# Patient Record
Sex: MALE | Race: WHITE | Employment: FULL TIME | URBAN - METROPOLITAN AREA
[De-identification: names, ages, dates, MRNs, and addresses within clinical notes are randomized per-mention and may not be internally consistent; named-entity substitution may affect disease eponyms.]

---

## 2017-08-02 ENCOUNTER — HOSPITAL ENCOUNTER (OUTPATIENT)
Dept: LAB | Age: 42
Discharge: HOME | End: 2017-08-02
Payer: OTHER GOVERNMENT

## 2017-08-02 ENCOUNTER — OFFICE VISIT (OUTPATIENT)
Dept: HEMATOLOGY | Age: 42
End: 2017-08-02

## 2017-08-02 VITALS
RESPIRATION RATE: 18 BRPM | HEIGHT: 69 IN | BODY MASS INDEX: 43.66 KG/M2 | TEMPERATURE: 98.7 F | OXYGEN SATURATION: 97 % | HEART RATE: 87 BPM | WEIGHT: 294.8 LBS | DIASTOLIC BLOOD PRESSURE: 79 MMHG | SYSTOLIC BLOOD PRESSURE: 122 MMHG

## 2017-08-02 DIAGNOSIS — R74.8 ELEVATED LIVER ENZYMES: ICD-10-CM

## 2017-08-02 DIAGNOSIS — R74.8 ELEVATED LIVER ENZYMES: Primary | ICD-10-CM

## 2017-08-02 PROBLEM — Z99.89 OSA ON CPAP: Status: ACTIVE | Noted: 2017-08-02

## 2017-08-02 PROBLEM — G47.30 SLEEP APNEA: Status: ACTIVE | Noted: 2017-08-02

## 2017-08-02 PROBLEM — I10 HYPERTENSION: Status: ACTIVE | Noted: 2017-08-02

## 2017-08-02 PROBLEM — E11.9 TYPE II DIABETES MELLITUS (HCC): Status: ACTIVE | Noted: 2017-08-02

## 2017-08-02 PROBLEM — E78.00 HYPERCHOLESTEROLEMIA: Status: ACTIVE | Noted: 2017-08-02

## 2017-08-02 PROBLEM — G47.33 OSA ON CPAP: Status: ACTIVE | Noted: 2017-08-02

## 2017-08-02 LAB
ALBUMIN SERPL BCP-MCNC: 4.2 G/DL (ref 3.4–5)
ALBUMIN/GLOB SERPL: 1.2 {RATIO} (ref 0.8–1.7)
ALP SERPL-CCNC: 55 U/L (ref 45–117)
ALT SERPL-CCNC: 222 U/L (ref 16–61)
ANION GAP BLD CALC-SCNC: 11 MMOL/L (ref 3–18)
AST SERPL W P-5'-P-CCNC: 84 U/L (ref 15–37)
BASOPHILS # BLD AUTO: 0 K/UL (ref 0–0.06)
BASOPHILS # BLD: 1 % (ref 0–2)
BILIRUB DIRECT SERPL-MCNC: 0.1 MG/DL (ref 0–0.2)
BILIRUB SERPL-MCNC: 0.7 MG/DL (ref 0.2–1)
BUN SERPL-MCNC: 16 MG/DL (ref 7–18)
BUN/CREAT SERPL: 17 (ref 12–20)
CALCIUM SERPL-MCNC: 9.5 MG/DL (ref 8.5–10.1)
CHLORIDE SERPL-SCNC: 98 MMOL/L (ref 100–108)
CHOLEST SERPL-MCNC: 203 MG/DL
CO2 SERPL-SCNC: 31 MMOL/L (ref 21–32)
CREAT SERPL-MCNC: 0.92 MG/DL (ref 0.6–1.3)
DIFFERENTIAL METHOD BLD: ABNORMAL
EOSINOPHIL # BLD: 0.3 K/UL (ref 0–0.4)
EOSINOPHIL NFR BLD: 4 % (ref 0–5)
ERYTHROCYTE [DISTWIDTH] IN BLOOD BY AUTOMATED COUNT: 14.7 % (ref 11.6–14.5)
EST. AVERAGE GLUCOSE BLD GHB EST-MCNC: 160 MG/DL
FERRITIN SERPL-MCNC: 648 NG/ML (ref 8–388)
GLOBULIN SER CALC-MCNC: 3.6 G/DL (ref 2–4)
GLUCOSE SERPL-MCNC: 112 MG/DL (ref 74–99)
HBA1C MFR BLD: 7.2 % (ref 4.5–5.6)
HCT VFR BLD AUTO: 46.7 % (ref 36–48)
HDLC SERPL-MCNC: 41 MG/DL (ref 40–60)
HDLC SERPL: 5 {RATIO} (ref 0–5)
HGB BLD-MCNC: 15.7 G/DL (ref 13–16)
INR PPP: 1 (ref 0.8–1.2)
IRON SATN MFR SERPL: 37 %
IRON SERPL-MCNC: 123 UG/DL (ref 50–175)
LDLC SERPL CALC-MCNC: 136 MG/DL (ref 0–100)
LIPID PROFILE,FLP: ABNORMAL
LYMPHOCYTES # BLD AUTO: 23 % (ref 21–52)
LYMPHOCYTES # BLD: 2 K/UL (ref 0.9–3.6)
MCH RBC QN AUTO: 29.3 PG (ref 24–34)
MCHC RBC AUTO-ENTMCNC: 33.6 G/DL (ref 31–37)
MCV RBC AUTO: 87.1 FL (ref 74–97)
MONOCYTES # BLD: 0.4 K/UL (ref 0.05–1.2)
MONOCYTES NFR BLD AUTO: 5 % (ref 3–10)
NEUTS SEG # BLD: 6 K/UL (ref 1.8–8)
NEUTS SEG NFR BLD AUTO: 67 % (ref 40–73)
PLATELET # BLD AUTO: 355 K/UL (ref 135–420)
PMV BLD AUTO: 10.1 FL (ref 9.2–11.8)
POTASSIUM SERPL-SCNC: 4 MMOL/L (ref 3.5–5.5)
PROT SERPL-MCNC: 7.8 G/DL (ref 6.4–8.2)
PROTHROMBIN TIME: 12.8 SEC (ref 11.5–15.2)
RBC # BLD AUTO: 5.36 M/UL (ref 4.7–5.5)
SODIUM SERPL-SCNC: 140 MMOL/L (ref 136–145)
TIBC SERPL-MCNC: 332 UG/DL (ref 250–450)
TRIGL SERPL-MCNC: 130 MG/DL (ref ?–150)
VLDLC SERPL CALC-MCNC: 26 MG/DL
WBC # BLD AUTO: 8.7 K/UL (ref 4.6–13.2)

## 2017-08-02 PROCEDURE — 82728 ASSAY OF FERRITIN: CPT | Performed by: INTERNAL MEDICINE

## 2017-08-02 PROCEDURE — 86708 HEPATITIS A ANTIBODY: CPT | Performed by: INTERNAL MEDICINE

## 2017-08-02 PROCEDURE — 82103 ALPHA-1-ANTITRYPSIN TOTAL: CPT | Performed by: INTERNAL MEDICINE

## 2017-08-02 PROCEDURE — 85610 PROTHROMBIN TIME: CPT | Performed by: INTERNAL MEDICINE

## 2017-08-02 PROCEDURE — 85025 COMPLETE CBC W/AUTO DIFF WBC: CPT | Performed by: INTERNAL MEDICINE

## 2017-08-02 PROCEDURE — 80061 LIPID PANEL: CPT | Performed by: INTERNAL MEDICINE

## 2017-08-02 PROCEDURE — 80076 HEPATIC FUNCTION PANEL: CPT | Performed by: INTERNAL MEDICINE

## 2017-08-02 PROCEDURE — 83540 ASSAY OF IRON: CPT | Performed by: INTERNAL MEDICINE

## 2017-08-02 PROCEDURE — 82390 ASSAY OF CERULOPLASMIN: CPT | Performed by: INTERNAL MEDICINE

## 2017-08-02 PROCEDURE — 86704 HEP B CORE ANTIBODY TOTAL: CPT | Performed by: INTERNAL MEDICINE

## 2017-08-02 PROCEDURE — 83516 IMMUNOASSAY NONANTIBODY: CPT | Performed by: INTERNAL MEDICINE

## 2017-08-02 PROCEDURE — 86706 HEP B SURFACE ANTIBODY: CPT | Performed by: INTERNAL MEDICINE

## 2017-08-02 PROCEDURE — 83036 HEMOGLOBIN GLYCOSYLATED A1C: CPT | Performed by: INTERNAL MEDICINE

## 2017-08-02 PROCEDURE — 36415 COLL VENOUS BLD VENIPUNCTURE: CPT | Performed by: INTERNAL MEDICINE

## 2017-08-02 PROCEDURE — 80048 BASIC METABOLIC PNL TOTAL CA: CPT | Performed by: INTERNAL MEDICINE

## 2017-08-02 RX ORDER — LISINOPRIL AND HYDROCHLOROTHIAZIDE 20; 25 MG/1; MG/1
TABLET ORAL DAILY
COMMUNITY

## 2017-08-02 RX ORDER — LORATADINE 10 MG/1
10 TABLET ORAL DAILY
COMMUNITY

## 2017-08-02 NOTE — PROGRESS NOTES
Georges Schaefer is a 38 y.o. male    No chief complaint on file. 1. Have you been to the ER, urgent care clinic or hospitalized since your last visit? NO.     2. Have you seen or consulted any other health care providers outside of the [facility] since your last visit (Include any pap smears or colon screening)?  NO

## 2017-08-02 NOTE — PROGRESS NOTES
[address] MD Dieter, [address], [place], [place]       ROSI España PA-C Charyl Chock, NP Michele Lab NP        at [address], [address] Abran Santillan  22.     [phone]     FAX: [phone]    at [address], [address] [facility], [address]     [phone]     FAX: [phone]         Patient Care Team:  Tomasa Rebolledo NP as PCP - General (Nurse Practitioner)      Problem List  Date Reviewed: 8/2/2017          Codes Class Noted    Elevated liver enzymes ICD-10-CM: R74.8  ICD-9-CM: 790.5  8/2/2017        Type II diabetes mellitus ([facility] [address] ICD-10-CM: E11.9  ICD-9-CM: 250.00  8/2/2017        Hypercholesterolemia ICD-10-CM: E78.00  ICD-9-CM: 272.0  8/2/2017        SHIRA on CPAP ICD-10-CM: G47.33, Z99.89  ICD-9-CM: 327.23, V46.8  8/2/2017        Hypertension ICD-10-CM: I10  ICD-9-CM: 401.9  8/2/2017                The physicians listed above have asked me to see Nathen Schreiber in consultation regarding elevated liver enzymes and its management. All medical records sent by the referring physicians were reviewed     The patient is a 38 y.o.  male who was first noted to have abnormalities in liver transaminases in 1/2017. Serologic evaluation for markers of chronic liver disease were negative for HCV, HBV, autoimmune liver disease. Imaging of the liver was not performed. An assessment of liver fibrosis with biopsy or elastography has not been performed. The patient had not started any new medications within 3 months preceeding the elevation in liver chemistries.     The most recent laboratory studies indicate that the liver transaminases are elevated, ALP is normal, tests of hepatic synthetic and metabolic function are normal, and the platelet count is normal.    The patient has no symptoms which could be attributed to the liver disorder. The patient completes all daily activities without any functional limitations. The patient has not experienced fatigue, pain in the right side over the liver,       ALLERGIES  No Known Allergies    MEDICATIONS  Current Outpatient Prescriptions   Medication Sig    lisinopril-hydroCHLOROthiazide (PRINZIDE, ZESTORETIC) 20-25 mg per tablet Take  by mouth daily.  loratadine (CLARITIN) 10 mg tablet Take 10 mg by mouth. No current facility-administered medications for this visit. SYSTEM REVIEW NOT RELATED TO LIVER DISEASE OR REVIEWED ABOVE:  Constitution systems: Negative for fever, chills, weight gain, weight loss. Eyes: Negative for visual changes. ENT: Negative for sore throat, painful swallowing. Respiratory: Negative for cough, hemoptysis, SOB. Cardiology: Negative for chest pain, palpitations. GI:  Negative for constipation or diarrhea. : Negative for urinary frequency, dysuria, hematuria, nocturia. Skin: Negative for rash. Hematology: Negative for easy bruising, blood clots. Musculo-skelatal: Negative for back pain, muscle pain, weakness. Neurologic: Negative for headaches, dizziness, vertigo, memory problems not related to HE. Psychology: Negative for anxiety, depression. FAMILY HISTORY:  The father has the following chronic diseases: DM. The mother has the following chronic diseases: DM. There is no family history of liver disease. SOCIAL HISTORY:  The patient is . The patient has 2 children, a son  in a MVA. The patient has never used tobacco products. The patient has never consumed significant amounts of alcohol. The patient currently works full time at [employer].         PHYSICAL EXAMINATION:  Visit Vitals    /79 (BP 1 Location: Right arm, BP Patient Position: Sitting)    Pulse 87    Temp 98.7 °F (37.1 °C) (Tympanic)    Resp 18    Ht 5' 9\" (1.753 m)    Wt 294 lb 12.8 oz (133.7 kg)    SpO2 97%    BMI 43.53 kg/m2     General: No acute distress. Eyes: Sclera anicteric. ENT: No oral lesions. Thyroid normal.  Nodes: No adenopathy. Skin: No spider angiomata. No jaundice. No palmar erythema. Respiratory: Lungs clear to auscultation. Cardiovascular: Regular heart rate. No murmurs. No JVD. Abdomen: Soft non-tender. Liver size normal to percussion/palpation. Spleen not palpable. No obvious ascites. Extremities: No edema. No muscle wasting. No gross arthritic changes. Neurologic: Alert and oriented. Cranial nerves grossly intact. No asterixis. LABORATORY STUDIES:  Liver [facility] Ref Rng & Units 8/2/2017   WBC 4.6 - 13.2 K/uL 8.7   ANC 1.8 - 8.0 K/UL 6.0   HGB 13.0 - 16.0 g/dL 15.7    - 420 K/uL 355   INR 0.8 - 1.2   1.0   AST 15 - 37 U/L 84 (H)   ALT 16 - 61 U/L 222 (H)   Alk Phos 45 - 117 U/L 55   Bili, Total 0.2 - 1.0 MG/DL 0.7   Bili, Direct 0.0 - 0.2 MG/DL 0.1   Albumin 3.4 - 5.0 g/dL 4.2   BUN 7.0 - 18 MG/DL 16   Creat 0.6 - 1.3 MG/DL 0.92   Na 136 - 145 mmol/L 140   K 3.5 - 5.5 mmol/L 4.0   Cl 100 - 108 mmol/L 98 (L)   CO2 21 - 32 mmol/L 31   Glucose 74 - 99 mg/dL 112 (H)     SEROLOGIES:  2/2017. HBsurface antigen negative, anti-HCV negative, Ferritin 567, CARMELITA negative,     Serologies Latest Ref Rng & Units 8/2/2017   Hep A Ab, Total NEGATIVE   Positive (A)   Hep B Core Ab, Total NEGATIVE   NEGATIVE   Hep B Surface Ab >10.0 mIU/mL <3.10 (L)   Hep B Surface Ab Interp POS   NEGATIVE (A)   Ferritin 8 - 388 NG/ (H)   Iron % Saturation % 37   ASMCA 0 - 19 Units 9   Ceruloplasmin 16.0 - 31.0 mg/dL 28.5   Alpha-1 antitrypsin level 90 - 200 mg/dL 153     LIVER HISTOLOGY:  Not available or performed    ENDOSCOPIC PROCEDURES:  Not available or performed    RADIOLOGY:  Not available or performed    OTHER TESTING:  Not available or performed    ASSESSMENT AND PLAN:  Persistent elevation in liver transaminases of unclear etiology at this time.       Liver function is normal.  The platelet count is normal.      Serologic testing to help define the cause of the laboratory abnormality were all negative. The most likely causes for the liver chemistry abnormalities were discussed with the patient and include fatty liver disease,     Will perform laboratory testing to monitor liver function and degree of liver injury. This included BMP, hepatic panel, CBC with platelet count, INR    Will perform imaging of the liver with ultrasound. The need to perform a liver biopsy to help determine the cause and severity of the liver test abnormalities was discussed. The risks of performing the liver biopsy including pain, puncture of the lung, gallbladder, intestine or kidney and bleeding were discussed. The patient has decided to have a liver biopsy. This will be scheduled. The patient was directed to continue all current medications at the current dosages. There are no contraindications for the patient to take any medications that are necessary for treatment of other medical issues. The patient was counseled regarding alcohol consumption. Vaccination for viral hepatitis B is recommended since the patient has no serologic evidence of previous exposure or vaccination with immunity. The patient was likely vaccinated for HBV after he joined the [employer]. The level of antibody may have declined below detection. Would give 1 dose of booster and see if he responds. Vaccination for viral hepatitis A is not needed. The patient has serologic evidence of prior exposure or vaccination with immunity. All of the above issues were discussed with the patient. All questions were answered. The patient expressed a clear understanding of the above. [address] [address] in 2 weeks after liver biopsy.     Tamara Sandhu MD  Liver [place] of [address] [place] [address], [address] [place] [address] [facility], [address]  [phone]

## 2017-08-02 NOTE — MR AVS SNAPSHOT
Visit Information Date & Time Provider Department Dept. Phone Encounter #  
 8/2/2017 11:30 AM Bonifacio Rodriguez MD [facility] [phone] [id number] Upcoming Health Maintenance Date Due DTaP/Tdap/Td series (1 - Tdap) 6/7/1998 INFLUENZA AGE 9 TO ADULT 8/1/2017 Allergies as of 8/2/2017  Review Complete On: 8/2/2017 By: Maria Eugenia Bingham No Known Allergies Current Immunizations  Never Reviewed No immunizations on file. Not reviewed this visit You Were Diagnosed With   
  
 Codes Comments Elevated liver enzymes    -  Primary ICD-10-CM: R74.8 ICD-9-CM: 790.5 Vitals BP Pulse Temp Resp Height(growth percentile) 122/79 (BP 1 Location: Right arm, BP Patient Position: Sitting) 87 98.7 °F (37.1 °C) (Tympanic) 18 5' 9\" (1.753 m) Weight(growth percentile) SpO2 BMI Smoking Status 294 lb 12.8 oz (133.7 kg) 97% 43.53 kg/m2 Former Smoker BMI and BSA Data Body Mass Index Body Surface Area  
 43.53 kg/m 2 2.55 m 2 Your Updated Medication List  
  
   
This list is accurate as of: 8/2/17 12:50 PM.  Always use your most recent med list.  
  
  
  
  
 lisinopril-hydroCHLOROthiazide 20-25 mg per tablet Commonly known as:  Oksana Sabins Take  by mouth daily. loratadine 10 mg tablet Commonly known as:  Joni Ana Rosa Take 10 mg by mouth. To-Do List   
 08/02/2017 Lab:  ACTIN (SMOOTH MUSCLE) ANTIBODY   
  
 08/02/2017 Lab:  ALPHA-1-ANTITRYPSIN, TOTAL   
  
 08/02/2017 Lab:  CBC WITH AUTOMATED DIFF   
  
 08/02/2017 Lab:  CERULOPLASMIN   
  
 08/02/2017 Lab:  FERRITIN   
  
 08/02/2017 Lab:  HEMOGLOBIN A1C WITH EAG   
  
 08/02/2017 Lab:  HEP A AB, TOTAL   
  
 08/02/2017 Lab:  HEP B SURFACE AB   
  
 08/02/2017 Lab:  HEPATIC FUNCTION PANEL   
  
 08/02/2017 Lab:  HEPATITIS B CORE AB, TOTAL   
  
 08/02/2017   Lab:  IRON PROFILE   
  
 08/02/2017 Lab:  LIPID PANEL   
  
 08/02/2017 Lab:  METABOLIC PANEL, BASIC   
  
 08/02/2017 Lab:  PROTHROMBIN TIME + INR   
  
 09/01/2017 Procedures:  BIOPSY LIVER Introducing [facility]! Elvira Peterson introduces Intr[employer] patient portal. Now you can access parts of your medical record, email your doctor's office, and request medication refills online. 1. In your internet browser, go to [email or web address] 2. Click on the First Time User? Click Here link in the Sign In box. You will see the New Member Sign Up page. 3. Enter your [employer] Access Code exactly as it appears below. You will not need to use this code after youve completed the sign-up process. If you do not sign up before the expiration date, you must request a new code. · [employer] Access Code: [email or web address] Expires: 10/31/2017 12:50 PM 
 
4. Enter the last four digits of your Social Security Number (xxxx) and Date of Birth (mm/dd/yyyy) as indicated and click Submit. You will be taken to the next sign-up page. 5. Create a Intr[employer]ic ID. This will be your [employer]ic login ID and cannot be changed, so think of one that is secure and easy to remember. 6. Create a [employer] password. You can change your password at any time. 7. Enter your Password Reset Question and Answer. This can be used at a later time if you forget your password. 8. Enter your e-mail address. You will receive e-mail notification when new information is available in [address] 9. Click Sign Up. You can now view and download portions of your medical record. 10. Click the Download Summary menu link to download a portable copy of your medical information. If you have questions, please visit the Frequently Asked Questions section of the [employer] website. Remember, [employer] is NOT to be used for urgent needs. For medical emergencies, dial 911. Now available from your iPhone and Android! Please provide this summary of care documentation to your next provider. Your primary care clinician is listed as Bruce Ramos. If you have any questions after today's visit, please call [phone].

## 2017-08-03 LAB
A1AT SERPL-MCNC: 153 MG/DL (ref 90–200)
ACTIN IGG SERPL-ACNC: 9 UNITS (ref 0–19)
CERULOPLASMIN SERPL-MCNC: 28.5 MG/DL (ref 16–31)
HAV AB SER QL IA: POSITIVE
HBV CORE AB SERPL QL IA: NEGATIVE
HBV SURFACE AB SER QL IA: NEGATIVE
HBV SURFACE AB SERPL IA-ACNC: <3.1 MIU/ML
HEP BS AB COMMENT,HBSAC: ABNORMAL

## 2017-09-20 ENCOUNTER — APPOINTMENT (OUTPATIENT)
Dept: ULTRASOUND IMAGING | Age: 42
End: 2017-09-20
Attending: INTERNAL MEDICINE
Payer: OTHER GOVERNMENT

## 2017-09-20 ENCOUNTER — HOSPITAL ENCOUNTER (OUTPATIENT)
Age: 42
Setting detail: OUTPATIENT SURGERY
Discharge: HOME | End: 2017-09-20
Attending: INTERNAL MEDICINE | Admitting: INTERNAL MEDICINE
Payer: OTHER GOVERNMENT

## 2017-09-20 VITALS
WEIGHT: 286 LBS | SYSTOLIC BLOOD PRESSURE: 102 MMHG | RESPIRATION RATE: 18 BRPM | OXYGEN SATURATION: 97 % | DIASTOLIC BLOOD PRESSURE: 61 MMHG | TEMPERATURE: 96 F | BODY MASS INDEX: 42.36 KG/M2 | HEART RATE: 65 BPM | HEIGHT: 69 IN

## 2017-09-20 DIAGNOSIS — R79.89 ELEVATED LFTS: ICD-10-CM

## 2017-09-20 LAB — GLUCOSE BLD STRIP.AUTO-MCNC: 135 MG/DL (ref 70–110)

## 2017-09-20 PROCEDURE — 77030013826 HC NDL BIOP MAXCOR BARD -B: Performed by: INTERNAL MEDICINE

## 2017-09-20 PROCEDURE — 77030018836 HC SOL IRR NACL ICUM -A: Performed by: INTERNAL MEDICINE

## 2017-09-20 PROCEDURE — 76040000019: Performed by: INTERNAL MEDICINE

## 2017-09-20 PROCEDURE — 74011250636 HC RX REV CODE- 250/636: Performed by: INTERNAL MEDICINE

## 2017-09-20 PROCEDURE — 88307 TISSUE EXAM BY PATHOLOGIST: CPT | Performed by: INTERNAL MEDICINE

## 2017-09-20 PROCEDURE — 74011000250 HC RX REV CODE- 250: Performed by: INTERNAL MEDICINE

## 2017-09-20 PROCEDURE — 76942 ECHO GUIDE FOR BIOPSY: CPT

## 2017-09-20 PROCEDURE — 88313 SPECIAL STAINS GROUP 2: CPT | Performed by: INTERNAL MEDICINE

## 2017-09-20 PROCEDURE — 82962 GLUCOSE BLOOD TEST: CPT

## 2017-09-20 RX ORDER — LIDOCAINE HYDROCHLORIDE 10 MG/ML
10 INJECTION INFILTRATION; PERINEURAL ONCE
Status: COMPLETED | OUTPATIENT
Start: 2017-09-20 | End: 2017-09-20

## 2017-09-20 RX ORDER — SODIUM CHLORIDE 0.9 % (FLUSH) 0.9 %
5-10 SYRINGE (ML) INJECTION AS NEEDED
Status: DISCONTINUED | OUTPATIENT
Start: 2017-09-20 | End: 2017-09-20 | Stop reason: HOSPADM

## 2017-09-20 RX ORDER — SODIUM CHLORIDE 9 MG/ML
100 INJECTION, SOLUTION INTRAVENOUS CONTINUOUS
Status: DISCONTINUED | OUTPATIENT
Start: 2017-09-20 | End: 2017-09-20 | Stop reason: HOSPADM

## 2017-09-20 RX ORDER — SODIUM CHLORIDE 0.9 % (FLUSH) 0.9 %
5-10 SYRINGE (ML) INJECTION EVERY 8 HOURS
Status: DISCONTINUED | OUTPATIENT
Start: 2017-09-20 | End: 2017-09-20 | Stop reason: HOSPADM

## 2017-09-20 RX ORDER — HYDROMORPHONE HYDROCHLORIDE 1 MG/ML
1 INJECTION, SOLUTION INTRAMUSCULAR; INTRAVENOUS; SUBCUTANEOUS
Status: DISCONTINUED | OUTPATIENT
Start: 2017-09-20 | End: 2017-09-20 | Stop reason: HOSPADM

## 2017-09-20 RX ADMIN — HYDROMORPHONE HYDROCHLORIDE 1 MG: 1 INJECTION, SOLUTION INTRAMUSCULAR; INTRAVENOUS; SUBCUTANEOUS at 08:57

## 2017-09-20 NOTE — IP AVS SNAPSHOT
[address] [address] 
[phone] Patient: Chelle Marques MRN: [id number] JUDIT:[id number] You are allergic to the following No active allergies Recent Documentation Height Weight BMI Smoking Status 1.753 m 129.7 kg 42.23 kg/m2 Former Smoker Emergency Contacts Name Discharge Info Relation Home Work Mobile Jessie Raya  Spouse [3] [phone] Diamond Raya  Spouse [3] [phone] About your hospitalization You were admitted on:  September 20, 2017 You last received care in the:  [facility] ENDOSCOPY You were discharged on:  September 20, 2017 Unit phone number:  [phone] Why you were hospitalized Your primary diagnosis was:  Not on File Providers Seen During Your Hospitalizations Provider Role Specialty Primary office phone Indy Jones MD Attending Provider Hepatology [phone] Your Primary Care Physician (PCP) Primary Care Physician Office Phone Office Fax Akanksha Zelaya [phone] Follow-up Information Follow up With Details Comments Contact Info Indy Jones MD   [address] 
[phone] Nancy Blake NP   [address] 
[phone] Your Appointments Wednesday October 11, 2017 11:15 AM EDT Follow Up with Indy Jones MD  
[id number] [facility] ([address])  
 [place] [address]  
[phone] Current Discharge Medication List  
  
CONTINUE these medications which have NOT CHANGED Dose & Instructions Dispensing Information Comments Morning Noon Evening Bedtime  
 lisinopril-hydroCHLOROthiazide 20-25 mg per tablet Commonly known as:  Lore Issa Your last dose was: Your next dose is: Take  by mouth daily. Refills:  0  
     
   
   
   
  
 loratadine 10 mg tablet Commonly known as:  Pietro Nomi Your last dose was: Your next dose is:    
   
   
 Dose:  10 mg Take 10 mg by mouth daily. Refills:  0  
     
   
   
   
  
 multivitamin tablet Commonly known as:  ONE A DAY Your last dose was: Your next dose is:    
   
   
 Dose:  1 Tab Take 1 Tab by mouth daily. Refills:  0  
     
   
   
   
  
 VITAMIN B-12 1,000 mcg tablet Generic drug:  cyanocobalamin Your last dose was: Your next dose is:    
   
   
 Dose:  1000 mcg Take 1,000 mcg by mouth daily. Refills:  0 Discharge Instructions Percutaneous Liver Biopsy: What to Expect at Home Your Recovery Percutaneous liver biopsy is a procedure to take a tiny sample (biopsy) of your liver tissue. Percutaneous (say \"per-dali-MAEGAN-valdo-) means \"through the skin. \" The procedure is also called aspiration biopsy or fine-needle aspiration. The tissue sample is looked at under a microscope. Your doctor can look for infection or other liver problems. You may have some pain where the biopsy needle entered your skin (the puncture site). You may also have pain in your shoulder. This is called referred pain. It is caused by pain traveling along a nerve near the biopsy site. The referred pain usually lasts less than 12 hours. You may have a small amount of bleeding from the puncture site. You will need to take it easy at home for 1 or 2 days after the procedure. You will probably be able to return to work and most of your usual activities after that. This care sheet gives you a general idea about how long it will take for you to recover. But each person recovers at a different pace. Follow the steps below to get better as quickly as possible. How can you care for yourself at home? Activity · Rest when you feel tired. Getting enough sleep will help you recover. · Try to walk each day. Start by walking a little more than you did the day before. Bit by bit, increase the amount you walk. Walking boosts blood flow and helps prevent pneumonia and constipation. · Avoid exercises that use your belly muscles and strenuous activities, such as bicycle riding, jogging, weight lifting, or aerobic exercise, for 1 week or until your doctor says it is okay. · Ask your doctor when you can drive again. · You will probably need to take 1 or 2 days off from work. It depends on the type of work you do and how you feel. · You will probably be able to shower the same day as the test, if your doctor says it is okay. Pat the puncture site dry. Do not take a bath for at least 2 days after the test, or until your doctor tells you it is okay. Diet · You can eat your normal diet. If your stomach is upset, try bland, low-fat foods like plain rice, broiled chicken, toast, and yogurt. · Drink plenty of fluids (unless your doctor tells you not to). Medicines · Your doctor will tell you if and when you can restart your medicines. He or she will also give you instructions about taking any new medicines. · If you take blood thinners, such as warfarin (Coumadin), clopidogrel (Plavix), or aspirin, be sure to talk to your doctor. He or she will tell you if and when to start taking those medicines again. Make sure that you understand exactly what your doctor wants you to do. · Be safe with medicines. Take pain medicines exactly as directed. ¨ If the doctor gave you a prescription medicine for pain, take it as prescribed. ¨ If you are not taking a prescription pain medicine, take an over-the-counter medicine that your doctor recommends. Read and follow all instructions on the label.  
¨ Do not take aspirin, ibuprofen (Advil, Motrin), naproxen (Aleve), or other nonsteroidal anti-inflammatory drugs (NSAIDs) unless your doctor says it is okay. · If you think your pain medicine is making you sick to your stomach: 
¨ Take your medicine after meals (unless your doctor has told you not to). ¨ Ask your doctor for a different kind of pain medicine. Care of the puncture site · Keep a bandage over the puncture site for the first 1 or 2 days. Follow-up care is a key part of your treatment and safety. Be sure to make and go to all appointments, and call your doctor if you are having problems. It's also a good idea to know your test results and keep a list of the medicines you take. When should you call for help? Call 911 anytime you think you may need emergency care. For example, call if: 
· You passed out (lost consciousness). · You have severe trouble breathing. · You have sudden chest pain and shortness of breath, or you cough up blood. · You have severe pain in your chest, shoulder, or belly. Call your doctor now or seek immediate medical care if: 
· You have new or worse shortness of breath. · Bright red blood has soaked through the bandage over the puncture site. · You have pain that does not get better after you take your pain medicine. · You are sick to your stomach or cannot keep fluids down. · You have a fever, chills, or body aches. · You have signs of infection, such as: 
¨ Increased pain, swelling, warmth, or redness. ¨ Red streaks leading from the puncture site. ¨ Pus draining from the puncture site. ¨ A fever. · You have new or worse pain at the puncture site. · You have new or worse belly swelling or bloating. · You have trouble passing urine or stool. · Your stools are black and tarlike or have streaks of blood. · You have pale-colored stools along with dark urine and itching. Watch closely for changes in your health, and be sure to contact your doctor if you have any problems. Where can you learn more? Go to http://sapna-luz.info/. Enter S874 in the search box to learn more about \"Percutaneous Liver Biopsy: What to Expect at Home. \" Current as of: October 14, 2016 Content Version: 11.3 © 7143-7100 [employer]. Care instructions adapted under license by [employer] (which disclaims liability or warranty for this information). If you have questions about a medical condition or this instruction, always ask your healthcare professional. [place] [address] any warranty or liability for your use of this information. Patient armband removed and shredded DISCHARGE SUMMARY from Nurse The following personal items are in your possession at time of discharge: 
 
Dental Appliances: None Visual Aid: Glasses PATIENT INSTRUCTIONS: 
 
After general anesthesia or intravenous sedation, for 24 hours or while taking prescription Narcotics: · Limit your activities · Do not drive and operate hazardous machinery · Do not make important personal or business decisions · Do  not drink alcoholic beverages · If you have not urinated within 8 hours after discharge, please contact your surgeon on call. Report the following to your surgeon: 
· Excessive pain, swelling, redness or odor of or around the surgical area · Temperature over 100.5 · Nausea and vomiting lasting longer than 4 hours or if unable to take medications · Any signs of decreased circulation or nerve impairment to extremity: change in color, persistent  numbness, tingling, coldness or increase pain · Any questions What to do at Home: 
Recommended activity: Activity as tolerated and no driving for today, If you experience any of the following symptoms as above, please follow up with Dr. Maksim Chacon. *  Please give a list of your current medications to your Primary Care Provider.  
 
*  Please update this list whenever your medications are discontinued, doses are 
 changed, or new medications (including over-the-counter products) are added. *  Please carry medication information at all times in case of emergency situations. These are general instructions for a healthy lifestyle: No smoking/ No tobacco products/ Avoid exposure to second hand smoke Surgeon General's Warning:  Quitting smoking now greatly reduces serious risk to your health. Obesity, smoking, and sedentary lifestyle greatly increases your risk for illness A healthy diet, regular physical exercise & weight monitoring are important for maintaining a healthy lifestyle You may be retaining fluid if you have a history of heart failure or if you experience any of the following symptoms:  Weight gain of 3 pounds or more overnight or 5 pounds in a week, increased swelling in our hands or feet or shortness of breath while lying flat in bed. Please call your doctor as soon as you notice any of these symptoms; do not wait until your next office visit. Recognize signs and symptoms of STROKE: 
 
F-face looks uneven A-arms unable to move or move unevenly S-speech slurred or non-existent T-time-call 911 as soon as signs and symptoms begin-DO NOT go Back to bed or wait to see if you get better-TIME IS BRAIN. Warning Signs of HEART ATTACK Call 911 if you have these symptoms: 
? Chest discomfort. Most heart attacks involve discomfort in the center of the chest that lasts more than a few minutes, or that goes away and comes back. It can feel like uncomfortable pressure, squeezing, fullness, or pain. ? Discomfort in other areas of the upper body. Symptoms can include pain or discomfort in one or both arms, the back, neck, jaw, or stomach. ? Shortness of breath with or without chest discomfort. ? Other signs may include breaking out in a cold sweat, nausea, or lightheadedness. Don't wait more than five minutes to call [address]!  Fast action can save your life. Calling 911 is almost always the fastest way to get lifesaving treatment. Emergency Medical Services staff can begin treatment when they arrive  up to an hour sooner than if someone gets to the hospital by car. The discharge information has been reviewed with the patient and spouse. The patient and spouse verbalized understanding. Discharge medications reviewed with the patient and spouse and appropriate educational materials and side effects teaching were provided. Discharge Orders None Prepmatic Announcement We are excited to announce that we are making your provider's discharge notes available to you in Prepmatic. You will see these notes when they are completed and signed by the physician that discharged you from your recent hospital stay. If you have any questions or concerns about any information you see in Prepmatic, please call the Health Information Department where you were seen or reach out to your Primary Care Provider for more information about your plan of care. Introducing [facility]! Dear Candace Jin: Thank you for requesting a Prepmatic account. Our records indicate that you already have an active Prepmatic account. You can access your account anytime at [email or web address] Did you know that you can access your hospital and ER discharge instructions at any time in Prepmatic? You can also review all of your test results from your hospital stay or ER visit. Additional Information If you have questions, please visit the Frequently Asked Questions section of the Prepmatic website at [email or web address] Remember, Prepmatic is NOT to be used for urgent needs. For medical emergencies, dial 911. Now available from your iPhone and Android! General Information Please provide this summary of care documentation to your next provider.  
  
  
    
    
 Patient Signature: ____________________________________________________________ Date:  ____________________________________________________________  
  
Edrie Gunnels Provider Signature:  ____________________________________________________________ Date:  ____________________________________________________________

## 2017-09-20 NOTE — DISCHARGE INSTRUCTIONS
Percutaneous Liver Biopsy: What to Expect at [facility]  Percutaneous liver biopsy is a procedure to take a tiny sample (biopsy) of your liver tissue. Percutaneous (say \"per-dali-MAEGAN-valdo-) means \"through the skin. \" The procedure is also called aspiration biopsy or fine-needle aspiration. The tissue sample is looked at under a microscope. Your doctor can look for infection or other liver problems. You may have some pain where the biopsy needle entered your skin (the puncture site). You may also have pain in your shoulder. This is called referred pain. It is caused by pain traveling along a nerve near the biopsy site. The referred pain usually lasts less than 12 hours. You may have a small amount of bleeding from the puncture site. You will need to take it easy at home for 1 or 2 days after the procedure. You will probably be able to return to work and most of your usual activities after that. This care sheet gives you a general idea about how long it will take for you to recover. But each person recovers at a different pace. Follow the steps below to get better as quickly as possible. How can you care for yourself at home? Activity  · Rest when you feel tired. Getting enough sleep will help you recover. · Try to walk each day. Start by walking a little more than you did the day before. Bit by bit, increase the amount you walk. Walking boosts blood flow and helps prevent pneumonia and constipation. · Avoid exercises that use your belly muscles and strenuous activities, such as bicycle riding, jogging, weight lifting, or aerobic exercise, for 1 week or until your doctor says it is okay. · Ask your doctor when you can drive again. · You will probably need to take 1 or 2 days off from work. It depends on the type of work you do and how you feel. · You will probably be able to shower the same day as the test, if your doctor says it is okay. Pat the puncture site dry.  Do not take a bath for at least 2 days after the test, or until your doctor tells you it is okay. Diet  · You can eat your normal diet. If your stomach is upset, try bland, low-fat foods like plain rice, broiled chicken, toast, and yogurt. · Drink plenty of fluids (unless your doctor tells you not to). Medicines  · Your doctor will tell you if and when you can restart your medicines. He or she will also give you instructions about taking any new medicines. · If you take blood thinners, such as warfarin (Coumadin), clopidogrel (Plavix), or aspirin, be sure to talk to your doctor. He or she will tell you if and when to start taking those medicines again. Make sure that you understand exactly what your doctor wants you to do. · Be safe with medicines. Take pain medicines exactly as directed. ¨ If the doctor gave you a prescription medicine for pain, take it as prescribed. ¨ If you are not taking a prescription pain medicine, take an over-the-counter medicine that your doctor recommends. Read and follow all instructions on the label. ¨ Do not take aspirin, ibuprofen (Advil, Motrin), naproxen (Aleve), or other nonsteroidal anti-inflammatory drugs (NSAIDs) unless your doctor says it is okay. · If you think your pain medicine is making you sick to your stomach:  ¨ Take your medicine after meals (unless your doctor has told you not to). ¨ Ask your doctor for a different kind of pain medicine. Care of the puncture site  · Keep a bandage over the puncture site for the first 1 or 2 days. Follow-up care is a key part of your treatment and safety. Be sure to make and go to all appointments, and call your doctor if you are having problems. It's also a good idea to know your test results and keep a list of the medicines you take. When should you call for help? Call 911 anytime you think you may need emergency care. For example, call if:  · You passed out (lost consciousness). · You have severe trouble breathing.   · You have sudden chest pain and shortness of breath, or you cough up blood. · You have severe pain in your chest, shoulder, or belly. Call your doctor now or seek immediate medical care if:  · You have new or worse shortness of breath. · Bright red blood has soaked through the bandage over the puncture site. · You have pain that does not get better after you take your pain medicine. · You are sick to your stomach or cannot keep fluids down. · You have a fever, chills, or body aches. · You have signs of infection, such as:  ¨ Increased pain, swelling, warmth, or redness. ¨ Red streaks leading from the puncture site. ¨ Pus draining from the puncture site. ¨ A fever. · You have new or worse pain at the puncture site. · You have new or worse belly swelling or bloating. · You have trouble passing urine or stool. · Your stools are black and tarlike or have streaks of blood. · You have pale-colored stools along with dark urine and itching. Watch closely for changes in your health, and be sure to contact your doctor if you have any problems. Where can you learn more? Go to http://sapna-luz.info/. Enter A792 in the search box to learn more about \"Percutaneous Liver Biopsy: What to Expect at Home. \"  Current as of: October 14, 2016  Content Version: 11.3  © 8500-7211 [employer]. Care instructions adapted under license by [employer] (which disclaims liability or warranty for this information). If you have questions about a medical condition or this instruction, always ask your healthcare professional. [place] [address] any warranty or liability for your use of this information.   Patient armband removed and shredded    DISCHARGE SUMMARY from Nurse    The following personal items are in your possession at time of discharge:    Dental Appliances: None  Visual Aid: Glasses                            PATIENT INSTRUCTIONS:    After general anesthesia or intravenous sedation, for 24 hours or while taking prescription Narcotics:  · Limit your activities  · Do not drive and operate hazardous machinery  · Do not make important personal or business decisions  · Do  not drink alcoholic beverages  · If you have not urinated within 8 hours after discharge, please contact your surgeon on call. Report the following to your surgeon:  · Excessive pain, swelling, redness or odor of or around the surgical area  · Temperature over 100.5  · Nausea and vomiting lasting longer than 4 hours or if unable to take medications  · Any signs of decreased circulation or nerve impairment to extremity: change in color, persistent  numbness, tingling, coldness or increase pain  · Any questions        What to do at Home:  Recommended activity: Activity as tolerated and no driving for today,     If you experience any of the following symptoms as above, please follow up with Dr. Cherie Isaac. *  Please give a list of your current medications to your Primary Care Provider. *  Please update this list whenever your medications are discontinued, doses are      changed, or new medications (including over-the-counter products) are added. *  Please carry medication information at all times in case of emergency situations. These are general instructions for a healthy lifestyle:    No smoking/ No tobacco products/ Avoid exposure to second hand smoke    Surgeon General's Warning:  Quitting smoking now greatly reduces serious risk to your health. Obesity, smoking, and sedentary lifestyle greatly increases your risk for illness    A healthy diet, regular physical exercise & weight monitoring are important for maintaining a healthy lifestyle    You may be retaining fluid if you have a history of heart failure or if you experience any of the following symptoms:  Weight gain of 3 pounds or more overnight or 5 pounds in a week, increased swelling in our hands or feet or shortness of breath while lying flat in bed. Please call your doctor as soon as you notice any of these symptoms; do not wait until your next office visit. Recognize signs and symptoms of STROKE:    F-face looks uneven    A-arms unable to move or move unevenly    S-speech slurred or non-existent    T-time-call 911 as soon as signs and symptoms begin-DO NOT go       Back to bed or wait to see if you get better-TIME IS BRAIN. Warning Signs of HEART ATTACK     Call 911 if you have these symptoms:   Chest discomfort. Most heart attacks involve discomfort in the center of the chest that lasts more than a few minutes, or that goes away and comes back. It can feel like uncomfortable pressure, squeezing, fullness, or pain.  Discomfort in other areas of the upper body. Symptoms can include pain or discomfort in one or both arms, the back, neck, jaw, or stomach.  Shortness of breath with or without chest discomfort.  Other signs may include breaking out in a cold sweat, nausea, or lightheadedness. Don't wait more than five minutes to call 911 - MINUTES MATTER! Fast action can save your life. Calling 911 is almost always the fastest way to get lifesaving treatment. Emergency Medical Services staff can begin treatment when they arrive -- up to an hour sooner than if someone gets to the hospital by car. The discharge information has been reviewed with the patient and spouse. The patient and spouse verbalized understanding. Discharge medications reviewed with the patient and spouse and appropriate educational materials and side effects teaching were provided.

## 2017-09-20 NOTE — PROGRESS NOTES
Patient arrived back to room 06. Bleeding from site. Encourage patient to lay on right site.  NOw Dressing clean dry and intact at this time

## 2017-09-20 NOTE — H&P
134 E Muriel Garcias MD, [place], [place], [place]       Madi Abraham, [place], ANDI-ROSI Pappas NP        at [address], [address] [address]     [phone]     FAX: [phone]    at [facility], [address] [place] [address], [address]     [phone]     FAX: [phone]       PRE-PROCEDURE NOTE - LIVER BIOPSY    H and P from last office visit reviewed. Allergies reviewed. Out-patient medication list reviewed. Patient Active Problem List   Diagnosis Code    Elevated liver enzymes R74.8    Type II diabetes mellitus ([facility] E11.9    Hypercholesterolemia E78.00    SHIRA on CPAP G47.33, Z99.89    Hypertension I10       No Known Allergies    No current facility-administered medications on file prior to encounter. Current Outpatient Prescriptions on File Prior to Encounter   Medication Sig Dispense Refill    lisinopril-hydroCHLOROthiazide (PRINZIDE, ZESTORETIC) 20-25 mg per tablet Take  by mouth daily.  loratadine (CLARITIN) 10 mg tablet Take 10 mg by mouth daily. For liver biopsy to assess Abnormal liver enzymes. Suspect NAFLD  The risks of the procedure were discussed with the patient. This included bleeding, pain, and puncture of other organs. All questions were answered. The patient wishes to proceed with the procedure. PHYSICAL EXAMINATION:  VS: per nursing note  General: No acute distress. Eyes: Sclera anicteric. ENT: No oral lesions. Thyroid normal.  Nodes: No adenopathy. Skin: No spider angiomata. No jaundice. No palmar erythema. Respiratory: Lungs clear to auscultation. Cardiovascular: Regular heart rate. No murmurs. No JVD. Abdomen: Soft non-tender, liver size normal to percussion/palpation. Spleen not palpable. No obvious ascites.   Extremities: No edema. No muscle wasting. No gross arthritic changes. Neurologic: Alert and oriented. Cranial nerves grossly intact. No asterixis. LABS:  Lab Results   Component Value Date/Time    WBC 8.7 08/02/2017 03:41 PM    HGB 15.7 08/02/2017 03:41 PM    HCT 46.7 08/02/2017 03:41 PM    PLATELET 681 45/88/3850 03:41 PM    MCV 87.1 08/02/2017 03:41 PM     Lab Results   Component Value Date/Time    INR 1.0 08/02/2017 03:41 PM    Prothrombin time 12.8 08/02/2017 03:41 PM       ASSESSMENT AND PLAN:  Liver biopsy under ultrasound guidance.     Betina Sahu MD  Liver [place] of [address] [place] [address], [address] [place] [address] Megan, [address] [phone]

## 2017-09-20 NOTE — PROCEDURES
354 Nikolai Silver MD, Kenziefranck Carter, [place] Raul [place], [place]       ROSI Lara PA-C [place], [facility], ROSI Hurst NP        at [address], [address] [address]     [phone]     FAX: [phone]    at [facility], [address] [place] [address], [address] [facility] [address]     [phone]     FAX: [phone]       LIVER BIOPSY PROCEDURE NOTE    Briseida Oropeza  1975    INDICATIONS/PRE-OPERATIVE  DIAGNOSIS:  Elevated liver enzymes. Suspect NAFLD    : Bruce Salvador MD    SEDATION: 1% Lidocaine injection 15 ml    PROCEDURE:  Informed consent to perform the procedure was obtained from the patient. The patient was positioned on the edge of the stretcher lying flat in the supine position. Ultrasound was utilized to image the liver. The diaphragm and any major mass lesion or vascular structures within the liver were identified. An appropriate site for liver biopsy was identified. The distance from the surface of the skin to the liver capsule was 4 cm. This area was prepped with betadine and draped in sterile fashion. The skin was infiltrated with 1% lidocaine. The deeper subcutanous tissues and liver capsule overlying the biopsy site were then infiltrated with 1% lidocaine until appropriate anesthesia was obtained. A small incision was made in the skin so the biopsy devise could be easily inserted. A total of 2 passes with the 18 gauge Bard biopsy devise was then made into the liver. Core(s) of liver tissue totaling 4 cm in length were obtained and placed into tissue fixative. A band aid was placed over the biopsy site. The patient was then repositioned on the right side and transported to the recovery area on the stretcher for routine monitoring until discharge.     The specimen was sent to pathology for processing via the normal transport mechanism. SPECIMEN REMOVED: Liver    ESTIMATED BLOOD LOSS: Negligible.       POST-OPERATIVE DIAGNOSIS: Same as Pre-operative Diagnosis    Mingo Martinez MD       9/20/2017  8:46 AM

## 2017-10-11 ENCOUNTER — OFFICE VISIT (OUTPATIENT)
Dept: HEMATOLOGY | Age: 42
End: 2017-10-11

## 2017-10-11 VITALS
RESPIRATION RATE: 20 BRPM | HEIGHT: 69 IN | SYSTOLIC BLOOD PRESSURE: 114 MMHG | WEIGHT: 288 LBS | DIASTOLIC BLOOD PRESSURE: 57 MMHG | BODY MASS INDEX: 42.65 KG/M2 | TEMPERATURE: 98.4 F | HEART RATE: 61 BPM | OXYGEN SATURATION: 97 %

## 2017-10-11 DIAGNOSIS — K75.81 NASH (NONALCOHOLIC STEATOHEPATITIS): Primary | ICD-10-CM

## 2017-10-11 NOTE — PROGRESS NOTES
134 E Rebound MD [place], [address], [place], [place]       Salena Reveal, NP Gregery Cranker, PA-C Charlcie Bianchi, NP Dorrene Margarita, NP        at [address]     [address] [place], [place] [address], [address] [address]     [phone]     FAX: [phone]    at [address], [address]     [phone]     FAX: [phone]         Patient Care Team:  Maryellen Saleem NP as PCP - General (Nurse Practitioner)      Problem List  Date Reviewed: 9/20/2017          Codes Class Noted    GARCIA (nonalcoholic steatohepatitis) ICD-10-CM: K75.81  ICD-9-CM: 571.8  8/2/2017        Type II diabetes mellitus ([facility] ICD-10-CM: E11.9  ICD-9-CM: 250.00  8/2/2017        Hypercholesterolemia ICD-10-CM: E78.00  ICD-9-CM: 272.0  8/2/2017        SHIRA on CPAP ICD-10-CM: G47.33, Z99.89  ICD-9-CM: 327.23, V46.8  8/2/2017        Hypertension ICD-10-CM: I10  ICD-9-CM: 401.9  8/2/2017              Shila Davis [facility] for management of non-alcoholic steatohepatitis (GARCIA). The active problem list, all pertinent past medical history, medications, liver histology, radiologic findings and laboratory findings related to the liver disorder were reviewed with the patient. Serologic evaluation for markers of chronic liver disease were negative    Imaging of the liver was not performed. The patient underwent a liver biopsy in 9/2017. The procedure was well tolerated. I have personally reviewed the liver biopsy slides. This demonstrates GARCIA with pericellular fibrosis. The most recent laboratory studies indicate that the liver transaminases are elevated, ALP is normal, tests of hepatic synthetic and metabolic function are normal, and the platelet count is normal.    The patient has no symptoms which could be attributed to the liver disorder.       The patient completes all daily activities without any functional limitations. The patient has not experienced fatigue, pain in the right side over the liver,       ALLERGIES  No Known Allergies    MEDICATIONS  Current Outpatient Prescriptions   Medication Sig    multivitamin (ONE A DAY) tablet Take 1 Tab by mouth daily.  cyanocobalamin (VITAMIN B-12) 1,000 mcg tablet Take 1,000 mcg by mouth daily.  lisinopril-hydroCHLOROthiazide (PRINZIDE, ZESTORETIC) 20-25 mg per tablet Take  by mouth daily.  loratadine (CLARITIN) 10 mg tablet Take 10 mg by mouth daily. No current facility-administered medications for this visit. SYSTEM REVIEW NOT RELATED TO LIVER DISEASE OR REVIEWED ABOVE:  Constitution systems: Negative for fever, chills, weight gain, weight loss. Eyes: Negative for visual changes. ENT: Negative for sore throat, painful swallowing. Respiratory: Negative for cough, hemoptysis, SOB. Cardiology: Negative for chest pain, palpitations. GI:  Negative for constipation or diarrhea. : Negative for urinary frequency, dysuria, hematuria, nocturia. Skin: Negative for rash. Hematology: Negative for easy bruising, blood clots. Musculo-skelatal: Negative for back pain, muscle pain, weakness. Neurologic: Negative for headaches, dizziness, vertigo, memory problems not related to HE. Psychology: Negative for anxiety, depression. FAMILY HISTORY:  The father has the following chronic diseases: DM. The mother has the following chronic diseases: DM. There is no family history of liver disease. SOCIAL HISTORY:  The patient is . The patient has 2 children, a son  in a MVA. The patient has never used tobacco products. The patient has never consumed significant amounts of alcohol. The patient currently works full time at [employer].         PHYSICAL EXAMINATION:  Visit Vitals    /57 (BP 1 Location: Right arm, BP Patient Position: Sitting)    Pulse 61    Temp 98.4 °F (36.9 °C) (Tympanic)    Resp 20    Ht 5' 9\" (1.753 m)    Wt 288 lb (130.6 kg)    SpO2 97%    BMI 42.53 kg/m2     General: No acute distress. Eyes: Sclera anicteric. ENT: No oral lesions. Thyroid normal.  Nodes: No adenopathy. Skin: No spider angiomata. No jaundice. No palmar erythema. Respiratory: Lungs clear to auscultation. Cardiovascular: Regular heart rate. No murmurs. No JVD. Abdomen: Soft non-tender. Liver size normal to percussion/palpation. Spleen not palpable. No obvious ascites. Extremities: No edema. No muscle wasting. No gross arthritic changes. Neurologic: Alert and oriented. Cranial nerves grossly intact. No asterixis. LABORATORY STUDIES:  Liver [place] of [address] [place] [address] Ref Rng & Units 8/2/2017   WBC 4.6 - 13.2 K/uL 8.7   ANC 1.8 - 8.0 K/UL 6.0   HGB 13.0 - 16.0 g/dL 15.7    - 420 K/uL 355   INR 0.8 - 1.2   1.0   AST 15 - 37 U/L 84 (H)   ALT 16 - 61 U/L 222 (H)   Alk Phos 45 - 117 U/L 55   Bili, Total 0.2 - 1.0 MG/DL 0.7   Bili, Direct 0.0 - 0.2 MG/DL 0.1   Albumin 3.4 - 5.0 g/dL 4.2   BUN 7.0 - 18 MG/DL 16   Creat 0.6 - 1.3 MG/DL 0.92   Na 136 - 145 mmol/L 140   K 3.5 - 5.5 mmol/L 4.0   Cl 100 - 108 mmol/L 98 (L)   CO2 21 - 32 mmol/L 31   Glucose 74 - 99 mg/dL 112 (H)     SEROLOGIES:  2/2017. HBsurface antigen negative, anti-HCV negative, Ferritin 567, CARMELITA negative,     Serologies Latest Ref Rng & Units 8/2/2017   Hep A Ab, Total NEGATIVE   Positive (A)   Hep B Core Ab, Total NEGATIVE   NEGATIVE   Hep B Surface Ab >10.0 mIU/mL <3.10 (L)   Hep B Surface Ab Interp POS   NEGATIVE (A)   Ferritin 8 - 388 NG/ (H)   Iron % Saturation % 37   ASMCA 0 - 19 Units 9   Ceruloplasmin 16.0 - 31.0 mg/dL 28.5   Alpha-1 antitrypsin level 90 - 200 mg/dL 153     LIVER HISTOLOGY:  9/2017. Slides reviewed by MLS. GARCIA. 66-75% macro and micovesicular steatosis. Mild ballooning. Mild inflammation. Stage 2 septal fibrosis. DARRION (311).     ENDOSCOPIC PROCEDURES:  Not available or performed    RADIOLOGY:  Not available or performed    OTHER TESTING:  Not available or performed    ASSESSMENT AND PLAN:  GARCIA with stage 2 septal fibrosis. Liver transaminases are elevated. Alkaline phosphate is normal.  Liver function is normal.  The platelet count is normal.      Will perform serologic and virologic studies to assess for other causes of chronic liver disease are negative. There is an elevation in ferritin with normal iron saturation. It is unlikely that the patient has hemochromatosis or is a carrier for an HFE gene. Will order HFE genetic testing. The elevation in ferritin reflects hepatic inflammation. The patient was counseled regarding diet and exercise to achieve weight loss. The best diet for patients with fatty liver is one very low in carbohydrates and enriched with protein such as an Michelle's program.      The patient was told to to consume any food products and drinks containing fructose as this enhances hepatic fat synthesis. There is no medication of vitamin supplements that we advocate for GARCIA. Using glitazones in patients without diabetes mellitus has been shown to reduce fat content in the liver but has no effect on fibrosis and is associated with weight gain. Vitamin E has also been used but the data is not very good and most experts no longer advocate this. The only medical treatments for GARCIA are though clinical trials. The patient was offered participation in a clinical trial for treatment of GARCIA. The patient would like to particiapte in a clinical trial.    We will continue to monitor the patient at periodic intervals. If weight loss is successful the fat will resolve from the liver and liver enzymes should return to the normal range. We would then repeat an ultrasound to see if this also returns to normal.  If liver enzymes do not return to normal with weight reduction additional evaluation may be necessary over time.       The patient was directed to continue all current medications at the current dosages. There are no contraindications for the patient to take any medications that are necessary for treatment of other medical issues including medications for diabetes mellitus and hypercholesterolemia. The patient was counseled regarding alcohol consumption and that this could contribute to fatty liver disease. Vaccination for viral hepatitis B is recommended since the patient has no serologic evidence of previous exposure or vaccination with immunity. Vaccination for viral hepatitis A is not needed. All of the above issues were discussed with the patient. All questions were answered. The patient expressed a clear understanding of the above. [address] [place] [address] in 4 months To assess the impact of diet and weight loss. The patient will return sooner if enrollment into a GARCIA clinical trial is possible.     Alexis Stoner MD  Liver [place] of [address], [address] [place] [address] - [address] [phone]

## 2017-10-11 NOTE — PROGRESS NOTES
Aislinn Adamson is a 38 y.o. male    No chief complaint on file. 1. Have you been to the ER, urgent care clinic or hospitalized since your last visit? NO.     2. Have you seen or consulted any other health care providers outside of the [address] [place] [address] since your last visit (Include any pap smears or colon screening)?  NO  Learning Assessment 8/2/2017   PRIMARY LEARNER Patient   HIGHEST LEVEL OF EDUCATION - PRIMARY LEARNER  SOME COLLEGE   BARRIERS PRIMARY LEARNER NONE   CO-LEARNER CAREGIVER No   PRIMARY LANGUAGE ENGLISH   LEARNER PREFERENCE PRIMARY LISTENING   ANSWERED BY patient   RELATIONSHIP SELF

## 2018-04-30 ENCOUNTER — HOSPITAL ENCOUNTER (OUTPATIENT)
Dept: LAB | Age: 43
Discharge: HOME | End: 2018-04-30

## 2018-04-30 PROCEDURE — 99000 SPECIMEN HANDLING OFFICE-LAB: CPT | Performed by: INTERNAL MEDICINE

## 2018-06-04 ENCOUNTER — HOSPITAL ENCOUNTER (OUTPATIENT)
Dept: ULTRASOUND IMAGING | Age: 43
Discharge: HOME | End: 2018-06-04
Attending: INTERNAL MEDICINE

## 2018-06-04 ENCOUNTER — HOSPITAL ENCOUNTER (OUTPATIENT)
Age: 43
Setting detail: OUTPATIENT SURGERY
Discharge: HOME | End: 2018-06-05
Attending: INTERNAL MEDICINE | Admitting: INTERNAL MEDICINE
Payer: OTHER GOVERNMENT

## 2018-06-04 DIAGNOSIS — R79.89 ELEVATED LFTS: ICD-10-CM

## 2018-06-04 LAB
APTT PPP: 26.4 SEC (ref 23–36.4)
GLUCOSE BLD STRIP.AUTO-MCNC: 139 MG/DL (ref 70–110)
INR PPP: 1 (ref 0.8–1.2)
PROTHROMBIN TIME: 12.3 SEC (ref 11.5–15.2)

## 2018-06-04 PROCEDURE — 85730 THROMBOPLASTIN TIME PARTIAL: CPT | Performed by: INTERNAL MEDICINE

## 2018-06-04 PROCEDURE — 85610 PROTHROMBIN TIME: CPT | Performed by: INTERNAL MEDICINE

## 2018-06-04 PROCEDURE — 82962 GLUCOSE BLOOD TEST: CPT

## 2018-06-04 NOTE — IP AVS SNAPSHOT
[address] [address] 
[phone] Patient: Lisa Finley MRN: [id number] HWK:[birth year] About your hospitalization You were admitted on:  June 4, 2018 You last received care in the:  [facility] ENDOSCOPY You were discharged on:  June 5, 2018 Why you were hospitalized Your primary diagnosis was:  Not on File Follow-up Information Follow up With Details Comments Contact Info Delma Mora MD   [address] [facility] [address] 1400 [address] [place] [address] 
[phone] Delma Mora MD   [address] [facility] [address] [place] [address] 
[phone] [facility],    [address] [facility] [address] [facility] 
[phone] Discharge Orders None A check marge indicates which time of day the medication should be taken. My Medications CONTINUE taking these medications Instructions Each Dose to Equal  
 Morning Noon Evening Bedtime  
 atorvastatin 10 mg tablet Commonly known as:  LIPITOR Your last dose was: Your next dose is: Take 10 mg by mouth daily. 10 mg  
    
   
   
   
  
 lisinopril-hydroCHLOROthiazide 20-25 mg per tablet Commonly known as:  Laquita Shafferet Your last dose was: Your next dose is: Take  by mouth daily. loratadine 10 mg tablet Commonly known as:  Veryl Bleak Your last dose was: Your next dose is: Take 10 mg by mouth daily. 10 mg  
    
   
   
   
  
 multivitamin tablet Commonly known as:  ONE A DAY Your last dose was: Your next dose is: Take 1 Tab by mouth daily. 1 Tab Discharge Instructions [address] [facility] 
 
 Richard Mora MD, Devante Davila, [facility] Mayda Melendez, [place], [facility]   Brinda Diaz, ROSI Coates, ROSI [facility] 
  at [address] 
  [address] [place] [address], [address] [address] 
  [phone] FAX: [address] [place] [address] Avenue 
  [facility] 
  [address], [address] [place], [address] [place] [address] [address] 
  [phone] FAX: [phone] LIVER BIOPSY DISCHARGE INSTRUCTIONS Mega Francis 1975 Date: 6/5/2018 DIET:   
You may resume your previous diet. ACTIVITIES: 
Rest quietly the rest of today. You should not lift any objects more than 20 pounds for the next 2 days. If you work sitting down without strenuous activity you may return to work tomorrow. If you exert yourself or do heavy lifting at work you should take tomorrow off. Do not drive or operate hazardous machinery for 12 hours. SPECIAL INSTRUCTIONS: 
Do not use any aspirin or non-steroidal (Motin, Advil, Naproxen, etc) pain medications for the next 3 days. You may use extra-strength Tylenol (acetaminophen) if you experience pain or discomfort later today. Call the [address] office if you experience any of the following: 
Persistent or severe abdominal pain. Persistent or severe abdominal distention. Fever and chills Nausea and vomiting. New or unusual symptoms. Follow-up care: You should have a follow up appointment with Dr. Ron Mejia to review the results of the liver biopsy results in 2 weeks. If you do not have an appointment please call the office at the number listed above to schedule this. Other instructions: If you have any problems or questions call the [employer] [facility] office at the phone number listed above. DISCHARGE SUMMARY from Nurse: The following personal items collected during your admission are returned to you:  
Dental Appliance: Dental Appliances: None Vision: Visual Aid: Glasses Hearing Aid:   
Jewelry:   
Clothing:   
Other Valuables:   
Valuables sent to safe:   
 
Patient armband removed and shredded DISCHARGE SUMMARY from Nurse PATIENT INSTRUCTIONS: 
 
 
F-face looks uneven A-arms unable to move or move unevenly S-speech slurred or non-existent T-time-call 911 as soon as signs and symptoms begin-DO NOT go Back to bed or wait to see if you get better-TIME IS BRAIN. Warning Signs of HEART ATTACK Call 911 if you have these symptoms: 
? Chest discomfort. Most heart attacks involve discomfort in the center of the chest that lasts more than a few minutes, or that goes away and comes back. It can feel like uncomfortable pressure, squeezing, fullness, or pain. ? Discomfort in other areas of the upper body. Symptoms can include pain or discomfort in one or both arms, the back, neck, jaw, or stomach. ? Shortness of breath with or without chest discomfort. ? Other signs may include breaking out in a cold sweat, nausea, or lightheadedness. Don't wait more than five minutes to call [address]! Fast action can save your life. Calling 911 is almost always the fastest way to get lifesaving treatment. Emergency Medical Services staff can begin treatment when they arrive  up to an hour sooner than if someone gets to the hospital by car. The discharge information has been reviewed with the patient and spouse. The patient and spouse verbalized understanding. Discharge medications reviewed with the patient and spouse and appropriate educational materials and side effects teaching were provided.  
___________________________________________________________________________ ________________________________________________________ Introducing [facility] & HEALTH SERVICES! Dear Daniella Covarrubias: Thank you for requesting a [employer] account. Our records indicate that you already have an active [employer] account. You can access your account anytime at [email or web address] Did you know that you can access your hospital and ER discharge instructions at any time in [employer]? You can also review all of your test results from your hospital stay or ER visit. Additional Information If you have questions, please visit the Frequently Asked Questions section of the [employer] website at [email or web address] Remember, [employer] is NOT to be used for urgent needs. For medical emergencies, dial 911. Now available from your iPhone and Android! Introducing Matt Ritchie As a [employer] [facility] patient, I wanted to make you aware of our electronic visit tool called Matt Ritchie. [employer] allows you to connect within minutes with a medical provider 24 hours a day, seven days a week via a mobile device or tablet or logging into a secure website from your computer. You can access Matt Ritchie from anywhere in the [place]. A virtual visit might be right for you when you have a simple condition and feel like you just dont want to get out of bed, or cant get away from work for an appointment, when your regular [employer] Mcgar[employer] [facility] provider is not available (evenings, weekends or holidays), or when youre out of town and need minor care. Electronic visits cost only $49 and if the [employer] provider determines a prescription is needed to treat your condition, one can be electronically transmitted to a nearby pharmacy*. Please take a moment to enroll today if you have not already done so. The enrollment process is free and takes just a few minutes.   To enroll, please download the [employer] mely to your tablet or phone, or visit [email or web address] to enroll on your computer. And, as an [address] [place] [address] patient with a [employer] account, the results of your visits will be scanned into your electronic medical record and your primary care provider will be able to view the scanned results. We urge you to continue to see your regular [facility] provider for your ongoing medical care. And while your primary care provider may not be the one available when you seek a [facility] [employer] virtual visit, the peace of mind you get from getting a real diagnosis real time can be priceless. For more information on [employer], view our Frequently Asked Questions (FAQs) at [email or web address]. Sincerely, 
 
Felicia Pritchett MD 
Chief Medical Officer [employer] *:  certain medications cannot be prescribed via [employer] Unresulted Labs-Please follow up with your PCP about these lab tests Order Current Status [address] [facility] In process Providers Seen During Your Hospitalization Provider Specialty Primary office phone Shruthi Vann MD Hepatology [phone] Your Primary Care Physician (PCP) Primary Care Physician Office Phone Office Fax Avril Chiu [phone] You are allergic to the following No active allergies Recent Documentation Height Weight BMI Smoking Status 1.778 m 126.6 kg 40.06 kg/m2 Former Smoker Emergency Contacts Name Discharge Info Relation Home Work Mobile Jessie Raya DISCHARGE CAREGIVER [3] Spouse [3] [phone] Patient Belongings The following personal items are in your possession at time of discharge: 
  Dental Appliances: None  Visual Aid: Glasses Please provide this summary of care documentation to your next provider.  
  
  
 
  
Signatures-by signing, you are acknowledging that this After Visit Summary has been reviewed with you and you have received a copy. Patient Signature:  ____________________________________________________________ Date:  ____________________________________________________________  
  
Lissie Mince Provider Signature:  ____________________________________________________________ Date:  ____________________________________________________________

## 2018-06-05 ENCOUNTER — HOSPITAL ENCOUNTER (OUTPATIENT)
Age: 43
Setting detail: OUTPATIENT SURGERY
End: 2018-06-05
Attending: INTERNAL MEDICINE | Admitting: INTERNAL MEDICINE
Payer: OTHER GOVERNMENT

## 2018-06-05 ENCOUNTER — APPOINTMENT (OUTPATIENT)
Dept: ULTRASOUND IMAGING | Age: 43
End: 2018-06-05
Attending: INTERNAL MEDICINE
Payer: OTHER GOVERNMENT

## 2018-06-05 VITALS
OXYGEN SATURATION: 97 % | WEIGHT: 279.2 LBS | DIASTOLIC BLOOD PRESSURE: 62 MMHG | HEIGHT: 70 IN | SYSTOLIC BLOOD PRESSURE: 105 MMHG | HEART RATE: 61 BPM | TEMPERATURE: 97.1 F | RESPIRATION RATE: 18 BRPM | BODY MASS INDEX: 39.97 KG/M2

## 2018-06-05 LAB — GLUCOSE BLD STRIP.AUTO-MCNC: 122 MG/DL (ref 70–110)

## 2018-06-05 PROCEDURE — 82962 GLUCOSE BLOOD TEST: CPT

## 2018-06-05 PROCEDURE — 76040000019: Performed by: INTERNAL MEDICINE

## 2018-06-05 PROCEDURE — 88313 SPECIAL STAINS GROUP 2: CPT | Performed by: INTERNAL MEDICINE

## 2018-06-05 PROCEDURE — 76942 ECHO GUIDE FOR BIOPSY: CPT

## 2018-06-05 PROCEDURE — 77030018836 HC SOL IRR NACL ICUM -A: Performed by: INTERNAL MEDICINE

## 2018-06-05 PROCEDURE — 74011250636 HC RX REV CODE- 250/636: Performed by: INTERNAL MEDICINE

## 2018-06-05 PROCEDURE — 74011000250 HC RX REV CODE- 250: Performed by: INTERNAL MEDICINE

## 2018-06-05 PROCEDURE — 77030013826 HC NDL BIOP MAXCOR BARD -B: Performed by: INTERNAL MEDICINE

## 2018-06-05 PROCEDURE — 88307 TISSUE EXAM BY PATHOLOGIST: CPT | Performed by: INTERNAL MEDICINE

## 2018-06-05 RX ORDER — ONDANSETRON 2 MG/ML
4 INJECTION INTRAMUSCULAR; INTRAVENOUS
Status: DISCONTINUED | OUTPATIENT
Start: 2018-06-05 | End: 2018-06-05 | Stop reason: HOSPADM

## 2018-06-05 RX ORDER — LIDOCAINE HYDROCHLORIDE 10 MG/ML
INJECTION INFILTRATION; PERINEURAL AS NEEDED
Status: DISCONTINUED | OUTPATIENT
Start: 2018-06-05 | End: 2018-06-05 | Stop reason: HOSPADM

## 2018-06-05 RX ORDER — HYDROMORPHONE HYDROCHLORIDE 1 MG/ML
1 INJECTION, SOLUTION INTRAMUSCULAR; INTRAVENOUS; SUBCUTANEOUS
Status: DISCONTINUED | OUTPATIENT
Start: 2018-06-05 | End: 2018-06-05 | Stop reason: HOSPADM

## 2018-06-05 RX ORDER — SODIUM CHLORIDE 0.9 % (FLUSH) 0.9 %
5-10 SYRINGE (ML) INJECTION AS NEEDED
Status: DISCONTINUED | OUTPATIENT
Start: 2018-06-05 | End: 2018-06-05 | Stop reason: HOSPADM

## 2018-06-05 RX ORDER — LIDOCAINE HYDROCHLORIDE 10 MG/ML
10 INJECTION INFILTRATION; PERINEURAL ONCE
Status: DISCONTINUED | OUTPATIENT
Start: 2018-06-05 | End: 2018-06-05 | Stop reason: HOSPADM

## 2018-06-05 RX ORDER — SODIUM CHLORIDE 0.9 % (FLUSH) 0.9 %
5-10 SYRINGE (ML) INJECTION EVERY 8 HOURS
Status: DISCONTINUED | OUTPATIENT
Start: 2018-06-05 | End: 2018-06-05 | Stop reason: HOSPADM

## 2018-06-05 RX ADMIN — ONDANSETRON 4 MG: 2 INJECTION INTRAMUSCULAR; INTRAVENOUS at 08:29

## 2018-06-05 RX ADMIN — Medication 1 MG: at 08:29

## 2018-06-05 NOTE — DISCHARGE INSTRUCTIONS
70 Gerardo Silver MD, [address], Cite Raul Terry, [place]       Mars Pickard, [place] Adame, MIKEP-BC   ROSI Blake NP [address] [facility] [address] 136    at [address], [address] Abran Santillan  22.    [phone]    FAX: [address] [place] [address], [address]    [phone]    FAX: [phone]       LIVER BIOPSY DISCHARGE INSTRUCTIONS      [facility]  1975  Date: 6/5/2018    DIET:    David Frye may resume your previous diet. ACTIVITIES:  Rest quietly the rest of today. You should not lift any objects more than 20 pounds for the next 2 days. If you work sitting down without strenuous activity you may return to work tomorrow. If you exert yourself or do heavy lifting at work you should take tomorrow off. Do not drive or operate hazardous machinery for 12 hours. SPECIAL INSTRUCTIONS:  Do not use any aspirin or non-steroidal (Motin, Advil, Naproxen, etc) pain medications for the next 3 days. You may use extra-strength Tylenol (acetaminophen) if you experience pain or discomfort later today. Call the [facility] office if you experience any of the following:  Persistent or severe abdominal pain. Persistent or severe abdominal distention. Fever and chills   Nausea and vomiting. New or unusual symptoms. Follow-up care: You should have a follow up appointment with Dr. Grayson Skaggs to review the results of the liver biopsy results in 2 weeks. If you do not have an appointment please call the office at the number listed above to schedule this. Other instructions:    If you have any problems or questions call the [facility] [employer] [facility] office at the phone number listed above. DISCHARGE SUMMARY from Nurse: The following personal items collected during your admission are returned to you:   Dental Appliance: Dental Appliances: None  Vision: Visual Aid: Glasses  Hearing Aid:    Jewelry:    Clothing:    Other Valuables:    Valuables sent to safe:      Patient armband removed and shredded    DISCHARGE SUMMARY from Nurse    PATIENT INSTRUCTIONS:    After general anesthesia or intravenous sedation, for 24 hours or while taking prescription Narcotics:  · Limit your activities  · Do not drive and operate hazardous machinery  · Do not make important personal or business decisions  · Do  not drink alcoholic beverages  · If you have not urinated within 8 hours after discharge, please contact your surgeon on call. Report the following to your surgeon:  · Excessive pain, swelling, redness or odor of or around the surgical area  · Temperature over 100.5  · Nausea and vomiting lasting longer than 4 hours or if unable to take medications  · Any signs of decreased circulation or nerve impairment to extremity: change in color, persistent  numbness, tingling, coldness or increase pain  · Any questions    What to do at Home:  Recommended activity: Activity as tolerated and no driving for today,     If you experience any of the following symptoms asa shira, please follow up with dr. David Floyd. *  Please give a list of your current medications to your Primary Care Provider. *  Please update this list whenever your medications are discontinued, doses are      changed, or new medications (including over-the-counter products) are added. *  Please carry medication information at all times in case of emergency situations. These are general instructions for a healthy lifestyle:    No smoking/ No tobacco products/ Avoid exposure to second hand smoke  Surgeon General's Warning:  Quitting smoking now greatly reduces serious risk to your health.     Obesity, smoking, and sedentary lifestyle greatly increases your risk for illness    A healthy diet, regular physical exercise & weight monitoring are important for maintaining a healthy lifestyle    You may be retaining fluid if you have a history of heart failure or if you experience any of the following symptoms:  Weight gain of 3 pounds or more overnight or 5 pounds in a week, increased swelling in our hands or feet or shortness of breath while lying flat in bed. Please call your doctor as soon as you notice any of these symptoms; do not wait until your next office visit. Recognize signs and symptoms of STROKE:    F-face looks uneven    A-arms unable to move or move unevenly    S-speech slurred or non-existent    T-time-call 911 as soon as signs and symptoms begin-DO NOT go       Back to bed or wait to see if you get better-TIME IS BRAIN. Warning Signs of HEART ATTACK     Call 911 if you have these symptoms:   Chest discomfort. Most heart attacks involve discomfort in the center of the chest that lasts more than a few minutes, or that goes away and comes back. It can feel like uncomfortable pressure, squeezing, fullness, or pain.  Discomfort in other areas of the upper body. Symptoms can include pain or discomfort in one or both arms, the back, neck, jaw, or stomach.  Shortness of breath with or without chest discomfort.  Other signs may include breaking out in a cold sweat, nausea, or lightheadedness. Don't wait more than five minutes to call 911 - MINUTES MATTER! Fast action can save your life. Calling 911 is almost always the fastest way to get lifesaving treatment. Emergency Medical Services staff can begin treatment when they arrive -- up to an hour sooner than if someone gets to the hospital by car. The discharge information has been reviewed with the patient and spouse. The patient and spouse verbalized understanding.   Discharge medications reviewed with the patient and spouse and appropriate educational materials and side effects teaching were provided.   ___________________________________________________________________________________________________________________________________

## 2018-06-05 NOTE — PROCEDURES
70 Gerardo Silver MD, Thao Perez, [place], [place]       ROSI Mustafa, [place] Calderon, [facility]-BC   Thee Montero, ROSI Montero [facility] 136    at [address], [address] Abrna Santillan  22.    [phone]    FAX: [address] [place] [address], [address]    [phone]    FAX: [phone]       LIVER BIOPSY PROCEDURE NOTE    Corina Tovar  1975    INDICATIONS/PRE-OPERATIVE  DIAGNOSIS:  GARCIA. Baseline biopsy for [facility] clinical trial    : Daniel Tellez MD    SEDATION: 1% Lidocaine injection 10 ml    PROCEDURE:  Informed consent to perform the procedure was obtained from the patient. The patient was positioned on the edge of the stretcher lying flat in the supine position. Ultrasound was utilized to image the liver. The diaphragm and any major mass lesion or vascular structures within the liver were identified. An appropriate site for liver biopsy was identified. The distance from the surface of the skin to the liver capsule was 10 cm. This area was prepped with betadine and draped in sterile fashion. The skin was infiltrated with 1% lidocaine. The deeper subcutanous tissues and liver capsule overlying the biopsy site were then infiltrated with 1% lidocaine until appropriate anesthesia was obtained. A small incision was made in the skin so the biopsy devise could be easily inserted. A total of 2 passes with the 18 gauge Bard biopsy devise was then made into the liver. Core(s) of liver tissue totaling 4 cm in length were obtained and placed into tissue fixative. A band aid was placed over the biopsy site.   The patient was then repositioned on the right side and transported to the recovery area on the stretcher for routine monitoring until discharge. The specimen was sent to pathology for processing via the normal transport mechanism. SPECIMEN REMOVED: Liver    ESTIMATED BLOOD LOSS: Negligible.       POST-OPERATIVE DIAGNOSIS: Same as Pre-operative Diagnosis    Indy Jones MD       6/5/2018  8:07 AM

## 2018-06-05 NOTE — H&P
70 Gerardo Silver MD, FACP, Cite Nestor Lauren, [place]       Madi Abraham, [place] Ritter, MIKEP-BC   Emiliano Wooten, ROSI [facility] [place] 136    at [facility]    [address], [address] [place]    [address] [facility]  22.    [phone]    FAX: [address] [place] [address] Drive, [address] [facility], [address]    [phone]    FAX: [phone]       PRE-PROCEDURE NOTE - LIVER BIOPSY    H and P from last office visit reviewed. Allergies reviewed. Out-patient medication list reviewed. Patient Active Problem List   Diagnosis Code    GARCIA (nonalcoholic steatohepatitis) K75.81    Type II diabetes mellitus ([facility] E11.9    Hypercholesterolemia E78.00    SHIRA on CPAP G47.33, Z99.89    Hypertension I10       No Known Allergies    No current facility-administered medications on file prior to encounter. Current Outpatient Prescriptions on File Prior to Encounter   Medication Sig Dispense Refill    multivitamin (ONE A DAY) tablet Take 1 Tab by mouth daily.  lisinopril-hydroCHLOROthiazide (PRINZIDE, ZESTORETIC) 20-25 mg per tablet Take  by mouth daily.  loratadine (CLARITIN) 10 mg tablet Take 10 mg by mouth daily. For liver biopsy to assess NALFD. Baseline biopsy for [facility] clinical trial.  The risks of the procedure were discussed with the patient. This included bleeding, pain, and puncture of other organs. All questions were answered. The patient wishes to proceed with the procedure. PHYSICAL EXAMINATION:  VS: per nursing note  General: No acute distress. Eyes: Sclera anicteric. ENT: No oral lesions. Thyroid normal.  Nodes: No adenopathy. Skin: No spider angiomata. No jaundice.   No palmar erythema. Respiratory: Lungs clear to auscultation. Cardiovascular: Regular heart rate. No murmurs. No JVD. Abdomen: Soft non-tender, liver size normal to percussion/palpation. Spleen not palpable. No obvious ascites. Extremities: No edema. No muscle wasting. No gross arthritic changes. Neurologic: Alert and oriented. Cranial nerves grossly intact. No asterixis. LABS:  Lab Results   Component Value Date/Time    WBC 8.7 08/02/2017 03:41 PM    HGB 15.7 08/02/2017 03:41 PM    HCT 46.7 08/02/2017 03:41 PM    PLATELET 254 45/33/5850 03:41 PM    MCV 87.1 08/02/2017 03:41 PM     Lab Results   Component Value Date/Time    INR 1.0 06/04/2018 07:38 AM    INR 1.0 08/02/2017 03:41 PM    Prothrombin time 12.3 06/04/2018 07:38 AM    Prothrombin time 12.8 08/02/2017 03:41 PM       ASSESSMENT AND PLAN:  Liver biopsy under ultrasound guidance.     MD Harmony Hamilton 13 of [address], [address] [place] [address], [address]  [phone]

## 2018-06-26 ENCOUNTER — HOSPITAL ENCOUNTER (OUTPATIENT)
Dept: NON INVASIVE DIAGNOSTICS | Age: 43
Discharge: HOME | End: 2018-06-26

## 2018-06-26 ENCOUNTER — HOSPITAL ENCOUNTER (OUTPATIENT)
Dept: LAB | Age: 43
Discharge: HOME | End: 2018-06-26

## 2018-06-26 DIAGNOSIS — Z00.6 EXAMINATION OF PARTICIPANT IN CLINICAL TRIAL: ICD-10-CM

## 2018-06-26 LAB
ATRIAL RATE: 75 BPM
CALCULATED P AXIS, ECG09: 19 DEGREES
CALCULATED R AXIS, ECG10: 22 DEGREES
CALCULATED T AXIS, ECG11: 26 DEGREES
DIAGNOSIS, 93000: NORMAL
P-R INTERVAL, ECG05: 166 MS
Q-T INTERVAL, ECG07: 400 MS
QRS DURATION, ECG06: 108 MS
QTC CALCULATION (BEZET), ECG08: 446 MS
VENTRICULAR RATE, ECG03: 75 BPM

## 2018-06-26 PROCEDURE — 99000 SPECIMEN HANDLING OFFICE-LAB: CPT | Performed by: INTERNAL MEDICINE

## 2018-06-26 PROCEDURE — 93005 ELECTROCARDIOGRAM TRACING: CPT

## 2018-09-18 ENCOUNTER — HOSPITAL ENCOUNTER (OUTPATIENT)
Dept: LAB | Age: 43
Discharge: HOME | End: 2018-09-18

## 2018-09-18 PROCEDURE — 99000 SPECIMEN HANDLING OFFICE-LAB: CPT | Performed by: INTERNAL MEDICINE

## 2018-12-11 ENCOUNTER — HOSPITAL ENCOUNTER (OUTPATIENT)
Dept: LAB | Age: 43
Discharge: HOME | End: 2018-12-11

## 2018-12-11 PROCEDURE — 99000 SPECIMEN HANDLING OFFICE-LAB: CPT

## 2019-03-05 ENCOUNTER — HOSPITAL ENCOUNTER (OUTPATIENT)
Dept: LAB | Age: 44
Discharge: HOME | End: 2019-03-05

## 2019-03-05 ENCOUNTER — HOSPITAL ENCOUNTER (OUTPATIENT)
Dept: NON INVASIVE DIAGNOSTICS | Age: 44
Discharge: HOME | End: 2019-03-05
Attending: INTERNAL MEDICINE

## 2019-03-05 DIAGNOSIS — Z00.6 EXAMINATION OF PARTICIPANT IN CLINICAL TRIAL: ICD-10-CM

## 2019-03-05 LAB
ATRIAL RATE: 65 BPM
CALCULATED P AXIS, ECG09: 19 DEGREES
CALCULATED R AXIS, ECG10: 17 DEGREES
CALCULATED T AXIS, ECG11: 25 DEGREES
DIAGNOSIS, 93000: NORMAL
P-R INTERVAL, ECG05: 176 MS
Q-T INTERVAL, ECG07: 412 MS
QRS DURATION, ECG06: 108 MS
QTC CALCULATION (BEZET), ECG08: 428 MS
VENTRICULAR RATE, ECG03: 65 BPM

## 2019-03-05 PROCEDURE — 99000 SPECIMEN HANDLING OFFICE-LAB: CPT

## 2019-03-05 PROCEDURE — 93005 ELECTROCARDIOGRAM TRACING: CPT

## 2019-05-28 ENCOUNTER — HOSPITAL ENCOUNTER (OUTPATIENT)
Dept: LAB | Age: 44
Discharge: HOME | End: 2019-05-28

## 2019-05-28 PROCEDURE — 99000 SPECIMEN HANDLING OFFICE-LAB: CPT

## 2019-08-20 ENCOUNTER — HOSPITAL ENCOUNTER (OUTPATIENT)
Dept: LAB | Age: 44
Discharge: HOME | End: 2019-08-20

## 2019-08-20 PROCEDURE — 99000 SPECIMEN HANDLING OFFICE-LAB: CPT

## 2019-11-06 ENCOUNTER — HOSPITAL ENCOUNTER (OUTPATIENT)
Dept: ULTRASOUND IMAGING | Age: 44
Discharge: HOME | End: 2019-11-06
Attending: INTERNAL MEDICINE

## 2019-11-06 ENCOUNTER — HOSPITAL ENCOUNTER (OUTPATIENT)
Age: 44
Setting detail: OUTPATIENT SURGERY
Discharge: HOME | End: 2019-11-06
Attending: INTERNAL MEDICINE | Admitting: INTERNAL MEDICINE

## 2019-11-06 VITALS
WEIGHT: 258 LBS | OXYGEN SATURATION: 95 % | TEMPERATURE: 97.6 F | HEART RATE: 77 BPM | RESPIRATION RATE: 16 BRPM | HEIGHT: 70 IN | SYSTOLIC BLOOD PRESSURE: 108 MMHG | DIASTOLIC BLOOD PRESSURE: 66 MMHG | BODY MASS INDEX: 36.94 KG/M2

## 2019-11-06 DIAGNOSIS — R79.89 ELEVATED LFTS: ICD-10-CM

## 2019-11-06 LAB — GLUCOSE BLD STRIP.AUTO-MCNC: 354 MG/DL (ref 70–110)

## 2019-11-06 PROCEDURE — 77030013826 HC NDL BIOP MAXCOR BARD -B: Performed by: INTERNAL MEDICINE

## 2019-11-06 PROCEDURE — 76040000019: Performed by: INTERNAL MEDICINE

## 2019-11-06 PROCEDURE — 82962 GLUCOSE BLOOD TEST: CPT

## 2019-11-06 PROCEDURE — 88307 TISSUE EXAM BY PATHOLOGIST: CPT

## 2019-11-06 PROCEDURE — 88313 SPECIAL STAINS GROUP 2: CPT

## 2019-11-06 PROCEDURE — 74011250636 HC RX REV CODE- 250/636: Performed by: INTERNAL MEDICINE

## 2019-11-06 PROCEDURE — 76705 ECHO EXAM OF ABDOMEN: CPT

## 2019-11-06 PROCEDURE — 77030018836 HC SOL IRR NACL ICUM -A: Performed by: INTERNAL MEDICINE

## 2019-11-06 PROCEDURE — 74011000250 HC RX REV CODE- 250: Performed by: INTERNAL MEDICINE

## 2019-11-06 RX ORDER — HYDROMORPHONE HYDROCHLORIDE 2 MG/ML
1 INJECTION, SOLUTION INTRAMUSCULAR; INTRAVENOUS; SUBCUTANEOUS
Status: DISCONTINUED | OUTPATIENT
Start: 2019-11-06 | End: 2019-11-06 | Stop reason: HOSPADM

## 2019-11-06 RX ORDER — ONDANSETRON 2 MG/ML
4 INJECTION INTRAMUSCULAR; INTRAVENOUS
Status: DISCONTINUED | OUTPATIENT
Start: 2019-11-06 | End: 2019-11-06 | Stop reason: HOSPADM

## 2019-11-06 RX ORDER — METFORMIN HYDROCHLORIDE 500 MG/1
1000 TABLET ORAL 2 TIMES DAILY WITH MEALS
COMMUNITY

## 2019-11-06 RX ORDER — LIDOCAINE HYDROCHLORIDE 10 MG/ML
10 INJECTION INFILTRATION; PERINEURAL ONCE
Status: COMPLETED | OUTPATIENT
Start: 2019-11-06 | End: 2019-11-06

## 2019-11-06 RX ORDER — SODIUM CHLORIDE 0.9 % (FLUSH) 0.9 %
5-40 SYRINGE (ML) INJECTION AS NEEDED
Status: DISCONTINUED | OUTPATIENT
Start: 2019-11-06 | End: 2019-11-06 | Stop reason: HOSPADM

## 2019-11-06 RX ORDER — HYDROMORPHONE HYDROCHLORIDE 1 MG/ML
1 INJECTION, SOLUTION INTRAMUSCULAR; INTRAVENOUS; SUBCUTANEOUS
Status: DISCONTINUED | OUTPATIENT
Start: 2019-11-06 | End: 2019-11-06 | Stop reason: RX

## 2019-11-06 RX ORDER — SODIUM CHLORIDE 0.9 % (FLUSH) 0.9 %
5-40 SYRINGE (ML) INJECTION EVERY 8 HOURS
Status: DISCONTINUED | OUTPATIENT
Start: 2019-11-06 | End: 2019-11-06 | Stop reason: HOSPADM

## 2019-11-06 RX ADMIN — HYDROMORPHONE HYDROCHLORIDE 1 MG: 2 INJECTION INTRAMUSCULAR; INTRAVENOUS; SUBCUTANEOUS at 08:18

## 2019-11-06 RX ADMIN — HYDROMORPHONE HYDROCHLORIDE 1 MG: 2 INJECTION INTRAMUSCULAR; INTRAVENOUS; SUBCUTANEOUS at 09:00

## 2019-11-06 RX ADMIN — ONDANSETRON 4 MG: 2 INJECTION INTRAMUSCULAR; INTRAVENOUS at 08:18

## 2019-11-06 NOTE — DISCHARGE INSTRUCTIONS
3340 [facility], MD, [address], Cite Corby Reyes MD Evins Hose, PA-C Janeann Barters, [facility]     Leeanna TALBOT Cielo, [facility]   Tae Phillips [facility]-JAYSON Guerra, [facility]       [facility] [place] 136    at [address], [address] Abran Santillan  22.    [phone]    FAX: [address] [place] [address], [address]    [phone]    FAX: [phone]         LIVER BIOPSY DISCHARGE INSTRUCTIONS      Jyotsna Ritchie  1975  Date: 11/6/2019    DIET:    Kwame Simple may resume your previous diet. ACTIVITIES:  Rest quietly the rest of today. You should not lift any objects more than 20 pounds for the next 2 days. If you work sitting down without strenuous activity you may return to work tomorrow. If you exert yourself or do heavy lifting at work you should take tomorrow off. Do not drive or operate hazardous machinery for 12 hours after you are discharged from this procedure. SPECIAL INSTRUCTIONS:  Do not use any aspirin or non-steroidal (Motin, Advil, Naproxen, etc) pain medications for the next 2 days. You may use extra-strength Tylenol (acetaminophen) if you experience pain or discomfort later today. Restarting blood thinners: If you were taking blood thinners prior to the procedure you can restart these in 2 days. Call the [address] [place] [address] office if you experience any of the following:  Persistent or severe abdominal pain. Persistent or severe abdominal distention. Fever and chills   Nausea and vomiting. New or unusual symptoms. Follow-up care: You should have a follow up appointment with Dr. Gisell Angel to review the results of the liver biopsy results in 2 weeks.   If you do not have an appointment please call the office at the number listed above to schedule this. Other instructions: If you have any problems or questions call the [address] office at the phone number listed above. DISCHARGE SUMMARY from Nurse: The following personal items collected during your admission are returned to you:   Dental Appliance: Dental Appliances: None  Vision: Visual Aid: Glasses, With patient  Hearing Aid:    Jewelry:    Clothing:    Other Valuables:    Valuables sent to safe:         DISCHARGE SUMMARY from Nurse    The following personal items collected during your admission are returned to you:   Dental Appliance: Dental Appliances: None  Vision: Visual Aid: Glasses, With patient  Hearing Aid:    Jewelry:    Clothing:    Other Valuables:    Valuables sent to safe:              PATIENT INSTRUCTIONS:    After general anesthesia or intravenous sedation, for 24 hours or while taking prescription Narcotics:  · Limit your activities  · Do not drive and operate hazardous machinery  · Do not make important personal or business decisions  · Do  not drink alcoholic beverages  · If you have not urinated within 8 hours after discharge, please contact your surgeon on call. Report the following to your surgeon:  · Excessive pain, swelling, redness or odor of or around the surgical area  · Temperature over 100.5  · Nausea and vomiting lasting longer than 4 hours or if unable to take medications  · Any signs of decreased circulation or nerve impairment to extremity: change in color, persistent  numbness, tingling, coldness or increase pain  · Any questions      Follow-up Appointments   Procedures    FOLLOW UP VISIT Appointment in: Other (Specify) AS scheduled     AS scheduled     Standing Status:   Standing     Number of Occurrences:   1     Order Specific Question:   Appointment in     Answer:    Other (Specify)       What to do at Home:  Recommended activity: as above,     If you experience any of the following symptoms as above, please follow up with Dr. Kem Ordoñez. *  Please give a list of your current medications to your Primary Care Provider. *  Please update this list whenever your medications are discontinued, doses are      changed, or new medications (including over-the-counter products) are added. *  Please carry medication information at all times in case of emergency situations. These are general instructions for a healthy lifestyle:    No smoking/ No tobacco products/ Avoid exposure to second hand smoke    Surgeon General's Warning:  Quitting smoking now greatly reduces serious risk to your health. Obesity, smoking, and sedentary lifestyle greatly increases your risk for illness    A healthy diet, regular physical exercise & weight monitoring are important for maintaining a healthy lifestyle    You may be retaining fluid if you have a history of heart failure or if you experience any of the following symptoms:  Weight gain of 3 pounds or more overnight or 5 pounds in a week, increased swelling in our hands or feet or shortness of breath while lying flat in bed. Please call your doctor as soon as you notice any of these symptoms; do not wait until your next office visit. Recognize signs and symptoms of STROKE:    F-face looks uneven    A-arms unable to move or move unevenly    S-speech slurred or non-existent    T-time-call 911 as soon as signs and symptoms begin-DO NOT go       Back to bed or wait to see if you get better-TIME IS BRAIN. The discharge information has been reviewed with the patient and spouse. The patient and spouse verbalized understanding. Warning Signs of HEART ATTACK     Call 911 if you have these symptoms:   Chest discomfort. Most heart attacks involve discomfort in the center of the chest that lasts more than a few minutes, or that goes away and comes back.  It can feel like uncomfortable pressure, squeezing, fullness, or pain.   Discomfort in other areas of the upper body. Symptoms can include pain or discomfort in one or both arms, the back, neck, jaw, or stomach.  Shortness of breath with or without chest discomfort.  Other signs may include breaking out in a cold sweat, nausea, or lightheadedness. Don't wait more than five minutes to call 911 - MINUTES MATTER! Fast action can save your life. Calling 911 is almost always the fastest way to get lifesaving treatment. Emergency Medical Services staff can begin treatment when they arrive -- up to an hour sooner than if someone gets to the hospital by car. The discharge information has been reviewed with the patient and caregiver. The patient and caregiver verbalized understanding. Discharge medications reviewed with the patient and guardian and appropriate educational materials and side effects teaching were provided.     Patient armband removed and shredded

## 2019-11-06 NOTE — PROCEDURES
3340 [facility] Vert[facility] Burton, Cite Mongi Slim, Stevenson Barn, MD Rexford Carota, PA-C Hildreth Elder, [facility]-BC     April S Cielo, [facility]   SABRINA Correa, [facility]       Berto Ashley [facility] 136    at [address], [address] [facility], [facility] 22.    [phone]    FAX: [address] [place] [address], 300 [facility] [address]    [phone]    FAX: [phone]         LIVER BIOPSY PROCEDURE NOTE    Deisy Rush  1975    INDICATIONS/PRE-OPERATIVE  DIAGNOSIS:  NAFLD    : Belkys Sanches MD    SURGICAL ASSISTANT:  None    PROSTHETIC DEVICES, TISSUE GRAFTS, TRANSPLANTED ORGANS:  Not applicable    SEDATION: 1% Lidocaine injection 10 ml    PROCEDURE:  Informed consent to perform the procedure was obtained from the patient. The patient was positioned on the edge of the stretcher lying flat in the supine position. Ultrasound was utilized to image the liver. The diaphragm and any major mass lesion or vascular structures within the liver were identified. An appropriate site for liver biopsy was identified. The distance from the surface of the skin to the liver capsule was 4 cm. This area was prepped with betadine and draped in sterile fashion. The skin was infiltrated with 1% lidocaine. The deeper subcutanous tissues and liver capsule overlying the biopsy site were then infiltrated with 1% lidocaine until appropriate anesthesia was obtained. A small incision was made in the skin so the biopsy devise could be easily inserted. A total of 2 passes with the 16 gauge Bard biopsy devise was then made into the liver. Core(s) of liver tissue totaling 4 cm in length were obtained and placed into tissue fixative.   A band aid was placed over the biopsy site. The patient was then repositioned on the right side and transported to the recovery area on the stretcher for routine monitoring until discharge. The specimen was sent to pathology for processing via the normal transport mechanism. SPECIMEN REMOVED: Liver    ESTIMATED BLOOD LOSS: Negligible.      POST-OPERATIVE DIAGNOSIS: Same as Pre-operative Diagnosis      Mahsa Castellanos MD  [address]  [address] [place] [address] 58 300 [facility] [address]  [address] [place] [address]

## 2019-11-06 NOTE — H&P
3340 [facility], MD, [address], Cite Corby Reyes MD Evins Hose, [place] James, [facility]     Leeanna Buck, [facility]   SABRINA Dumont, [facility]       Berto Osborneuta Greg [address] [place] 136    at [facility]    [address], [address] Abran Santillan  22.    [phone]    FAX: [address] [place] [address], [address]    [phone]    FAX: [phone]         PRE-PROCEDURE NOTE - LIVER BIOPSY    H and P from last office visit reviewed. Allergies reviewed. Out-patient medication list reviewed. Patient Active Problem List   Diagnosis Code    GARCIA (nonalcoholic steatohepatitis) K75.81    Type II diabetes mellitus ([facility] E11.9    Hypercholesterolemia E78.00    SHIRA on CPAP G47.33, Z99.89    Hypertension I10       No Known Allergies    No current facility-administered medications on file prior to encounter. Current Outpatient Medications on File Prior to Encounter   Medication Sig Dispense Refill    metFORMIN (GLUCOPHAGE) 500 mg tablet Take 500 mg by mouth two (2) times daily (with meals).  atorvastatin (LIPITOR) 10 mg tablet Take 10 mg by mouth daily.  multivitamin (ONE A DAY) tablet Take 1 Tab by mouth daily.  lisinopril-hydroCHLOROthiazide (PRINZIDE, ZESTORETIC) 20-25 mg per tablet Take  by mouth daily.  loratadine (CLARITIN) 10 mg tablet Take 10 mg by mouth daily. For liver biopsy to assess NALFD. The risks of the procedure were discussed with the patient. This included bleeding, pain, and puncture of other organs. All questions were answered. The patient wishes to proceed with the procedure. PHYSICAL EXAMINATION:  VS: per nursing note  General: No acute distress. Eyes: Sclera anicteric. ENT: No oral lesions. Thyroid normal.  Nodes: No adenopathy. Skin: No spider angiomata. No jaundice. No palmar erythema. Respiratory: Lungs clear to auscultation. Cardiovascular: Regular heart rate. No murmurs. No JVD. Abdomen: Soft non-tender, liver size normal to percussion/palpation. Spleen not palpable. No obvious ascites. Extremities: No edema. No muscle wasting. No gross arthritic changes. Neurologic: Alert and oriented. Cranial nerves grossly intact. No asterixis. LABS:  Lab Results   Component Value Date/Time    WBC 8.7 08/02/2017 03:41 PM    HGB 15.7 08/02/2017 03:41 PM    HCT 46.7 08/02/2017 03:41 PM    PLATELET 869 41/33/2292 03:41 PM    MCV 87.1 08/02/2017 03:41 PM     Lab Results   Component Value Date/Time    INR 1.0 06/04/2018 07:38 AM    INR 1.0 08/02/2017 03:41 PM    Prothrombin time 12.3 06/04/2018 07:38 AM    Prothrombin time 12.8 08/02/2017 03:41 PM       ASSESSMENT AND PLAN:  Liver biopsy under ultrasound guidance.     Belkys Sanches MD  [address]  [address] [place] [address], [address]  [address] [place] [address]

## 2019-11-07 ENCOUNTER — TELEPHONE (OUTPATIENT)
Dept: HEMATOLOGY | Age: 44
End: 2019-11-07

## 2019-11-07 NOTE — TELEPHONE ENCOUNTER
Pt believe he's having an reaction to Liver biopsy performed yesterday. Stated he's having pain at site that Lbx was preformed and in right shoulder, please call.

## 2019-11-12 ENCOUNTER — HOSPITAL ENCOUNTER (OUTPATIENT)
Dept: NON INVASIVE DIAGNOSTICS | Age: 44
Discharge: HOME | End: 2019-11-12
Payer: OTHER GOVERNMENT

## 2019-11-12 ENCOUNTER — HOSPITAL ENCOUNTER (OUTPATIENT)
Dept: LAB | Age: 44
Discharge: HOME | End: 2019-11-12

## 2019-11-12 PROCEDURE — 99000 SPECIMEN HANDLING OFFICE-LAB: CPT

## 2019-11-12 PROCEDURE — 93005 ELECTROCARDIOGRAM TRACING: CPT

## 2019-11-12 NOTE — TELEPHONE ENCOUNTER
Returned pts call re pain from [address] [place] [address]. Pt stated that it has gotten better and he has spoken with NP Jarad Perry. Instructed pt to call us if he needs anything else.

## 2019-11-13 LAB
ATRIAL RATE: 61 BPM
CALCULATED P AXIS, ECG09: 26 DEGREES
CALCULATED R AXIS, ECG10: 19 DEGREES
CALCULATED T AXIS, ECG11: 28 DEGREES
DIAGNOSIS, 93000: NORMAL
P-R INTERVAL, ECG05: 160 MS
Q-T INTERVAL, ECG07: 416 MS
QRS DURATION, ECG06: 110 MS
QTC CALCULATION (BEZET), ECG08: 418 MS
VENTRICULAR RATE, ECG03: 61 BPM

## 2020-08-10 ENCOUNTER — HOSPITAL ENCOUNTER (OUTPATIENT)
Dept: LAB | Age: 45
Discharge: HOME | End: 2020-08-10

## 2020-08-10 ENCOUNTER — HOSPITAL ENCOUNTER (OUTPATIENT)
Dept: NON INVASIVE DIAGNOSTICS | Age: 45
Discharge: HOME | End: 2020-08-10

## 2020-08-10 LAB
ATRIAL RATE: 69 BPM
CALCULATED P AXIS, ECG09: 56 DEGREES
CALCULATED R AXIS, ECG10: 46 DEGREES
CALCULATED T AXIS, ECG11: 57 DEGREES
DIAGNOSIS, 93000: NORMAL
P-R INTERVAL, ECG05: 148 MS
Q-T INTERVAL, ECG07: 408 MS
QRS DURATION, ECG06: 92 MS
QTC CALCULATION (BEZET), ECG08: 437 MS
VENTRICULAR RATE, ECG03: 69 BPM

## 2020-08-10 PROCEDURE — 93005 ELECTROCARDIOGRAM TRACING: CPT

## 2020-08-10 PROCEDURE — 99000 SPECIMEN HANDLING OFFICE-LAB: CPT

## 2020-08-31 ENCOUNTER — TELEPHONE (OUTPATIENT)
Dept: HEMATOLOGY | Age: 45
End: 2020-08-31

## 2020-08-31 NOTE — TELEPHONE ENCOUNTER
Called patient. Informed patient that he met the pre-screening qualifications for a different research trial that we have now and currently enrolling patients: Kumar. Patient stated that he was interested. Scheduled patient to come in on September 17, 2020 at 7:30am with David Tellez NP for screening visit. Advised patient that I would be sending him a copy of the informed consent to review. Patient voiced understanding and had no further questions at this time.

## 2020-09-16 ENCOUNTER — DOCUMENTATION ONLY (OUTPATIENT)
Dept: HEMATOLOGY | Age: 45
End: 2020-09-16

## 2020-09-21 ENCOUNTER — HOSPITAL ENCOUNTER (OUTPATIENT)
Dept: LAB | Age: 45
Discharge: HOME | End: 2020-09-21

## 2020-09-21 PROCEDURE — 99000 SPECIMEN HANDLING OFFICE-LAB: CPT

## 2020-10-13 DIAGNOSIS — Z01.812 PRE-PROCEDURE LAB EXAM: Primary | ICD-10-CM

## 2020-10-19 DIAGNOSIS — K75.81 NASH (NONALCOHOLIC STEATOHEPATITIS): ICD-10-CM

## 2020-10-19 DIAGNOSIS — K75.81 NASH (NONALCOHOLIC STEATOHEPATITIS): Primary | ICD-10-CM

## 2020-10-23 ENCOUNTER — HOSPITAL ENCOUNTER (OUTPATIENT)
Dept: MRI IMAGING | Age: 45
Discharge: HOME | End: 2020-10-23
Attending: NURSE PRACTITIONER
Payer: OTHER GOVERNMENT

## 2020-10-23 ENCOUNTER — HOSPITAL ENCOUNTER (OUTPATIENT)
Dept: PREADMISSION TESTING | Age: 45
Discharge: HOME | End: 2020-10-23
Payer: OTHER GOVERNMENT

## 2020-10-23 PROCEDURE — 74181 MRI ABDOMEN W/O CONTRAST: CPT

## 2020-10-23 PROCEDURE — 87635 SARS-COV-2 COVID-19 AMP PRB: CPT

## 2020-10-24 LAB — SARS-COV-2, COV2NT: NOT DETECTED

## 2020-10-27 ENCOUNTER — APPOINTMENT (OUTPATIENT)
Dept: ULTRASOUND IMAGING | Age: 45
End: 2020-10-27
Attending: INTERNAL MEDICINE
Payer: OTHER GOVERNMENT

## 2020-10-27 ENCOUNTER — HOSPITAL ENCOUNTER (OUTPATIENT)
Age: 45
Setting detail: OUTPATIENT SURGERY
Discharge: HOME | End: 2020-10-27
Attending: INTERNAL MEDICINE | Admitting: INTERNAL MEDICINE
Payer: OTHER GOVERNMENT

## 2020-10-27 VITALS
DIASTOLIC BLOOD PRESSURE: 85 MMHG | RESPIRATION RATE: 16 BRPM | HEART RATE: 80 BPM | SYSTOLIC BLOOD PRESSURE: 130 MMHG | OXYGEN SATURATION: 96 % | WEIGHT: 280.5 LBS | BODY MASS INDEX: 40.16 KG/M2 | TEMPERATURE: 97.3 F | HEIGHT: 70 IN

## 2020-10-27 DIAGNOSIS — R79.89 ELEVATED LFTS: ICD-10-CM

## 2020-10-27 DIAGNOSIS — K75.81 NASH (NONALCOHOLIC STEATOHEPATITIS): ICD-10-CM

## 2020-10-27 LAB — GLUCOSE BLD STRIP.AUTO-MCNC: 189 MG/DL (ref 70–110)

## 2020-10-27 PROCEDURE — 47000 NEEDLE BIOPSY OF LIVER PERQ: CPT | Performed by: INTERNAL MEDICINE

## 2020-10-27 PROCEDURE — 2709999900 HC NON-CHARGEABLE SUPPLY: Performed by: INTERNAL MEDICINE

## 2020-10-27 PROCEDURE — 76942 ECHO GUIDE FOR BIOPSY: CPT | Performed by: INTERNAL MEDICINE

## 2020-10-27 PROCEDURE — 77030013826 HC NDL BIOP MAXCOR BARD -B: Performed by: INTERNAL MEDICINE

## 2020-10-27 PROCEDURE — 77030003666 HC NDL SPINAL BD -A: Performed by: INTERNAL MEDICINE

## 2020-10-27 PROCEDURE — 82962 GLUCOSE BLOOD TEST: CPT

## 2020-10-27 PROCEDURE — 88313 SPECIAL STAINS GROUP 2: CPT

## 2020-10-27 PROCEDURE — 74011000250 HC RX REV CODE- 250: Performed by: INTERNAL MEDICINE

## 2020-10-27 PROCEDURE — 76705 ECHO EXAM OF ABDOMEN: CPT

## 2020-10-27 PROCEDURE — 76040000007: Performed by: INTERNAL MEDICINE

## 2020-10-27 PROCEDURE — 88307 TISSUE EXAM BY PATHOLOGIST: CPT

## 2020-10-27 RX ORDER — HYDROMORPHONE HYDROCHLORIDE 2 MG/ML
1 INJECTION, SOLUTION INTRAMUSCULAR; INTRAVENOUS; SUBCUTANEOUS
Status: DISCONTINUED | OUTPATIENT
Start: 2020-10-27 | End: 2020-10-27 | Stop reason: HOSPADM

## 2020-10-27 RX ORDER — SODIUM CHLORIDE 0.9 % (FLUSH) 0.9 %
5-40 SYRINGE (ML) INJECTION AS NEEDED
Status: CANCELLED | OUTPATIENT
Start: 2020-10-27

## 2020-10-27 RX ORDER — ONDANSETRON 2 MG/ML
4 INJECTION INTRAMUSCULAR; INTRAVENOUS
Status: DISCONTINUED | OUTPATIENT
Start: 2020-10-27 | End: 2020-10-27 | Stop reason: HOSPADM

## 2020-10-27 RX ORDER — SODIUM CHLORIDE 0.9 % (FLUSH) 0.9 %
5-40 SYRINGE (ML) INJECTION EVERY 8 HOURS
Status: CANCELLED | OUTPATIENT
Start: 2020-10-27

## 2020-10-27 RX ORDER — LIDOCAINE HYDROCHLORIDE 10 MG/ML
10 INJECTION INFILTRATION; PERINEURAL ONCE
Status: COMPLETED | OUTPATIENT
Start: 2020-10-27 | End: 2020-10-27

## 2020-10-27 NOTE — H&P
Simone Abdalla MD, Leonard Garrido MD, MPH      Carlos López, [place], [facility]     Leeanna Buck [facility]   SABRINA Condon [facility]       Berto Ashley [facility] 136    at [place] [address] [address], [address] Abran Santillan  22.    [phone]    FAX: [address] [place] [address], [address]    [phone]    FAX: [phone]         PRE-PROCEDURE NOTE - LIVER BIOPSY    H and P from last office visit reviewed. Allergies reviewed. Out-patient medication list reviewed. Patient Active Problem List   Diagnosis Code    GARCIA (nonalcoholic steatohepatitis) K75.81    Type II diabetes mellitus ([facility] E11.9    Hypercholesterolemia E78.00    SHIRA on CPAP G47.33, Z99.89    Hypertension I10       No Known Allergies    No current facility-administered medications on file prior to encounter. Current Outpatient Medications on File Prior to Encounter   Medication Sig Dispense Refill    empagliflozin (Jardiance) 10 mg tablet Take 10 mg by mouth daily. Indications: type 2 diabetes mellitus      metFORMIN (GLUCOPHAGE) 500 mg tablet Take 500 mg by mouth two (2) times daily (with meals).  atorvastatin (LIPITOR) 10 mg tablet Take 10 mg by mouth daily.  multivitamin (ONE A DAY) tablet Take 1 Tab by mouth daily.  lisinopril-hydroCHLOROthiazide (PRINZIDE, ZESTORETIC) 20-25 mg per tablet Take  by mouth daily.  loratadine (CLARITIN) 10 mg tablet Take 10 mg by mouth daily. For liver biopsy to assess NALFD. The risks of the procedure were discussed with the patient. This included bleeding, pain, and puncture of other organs. All questions were answered.   The patient wishes to proceed with the procedure. The patient was counseled at length about the risks of trixie Covid-19 in the trey-operative and post-operative states including the recovery window of their procedure. The patient was made aware that trixie Covid-19 after a surgical procedure may worsen their prognosis for recovering from the virus and lend to a higher morbidity and or mortality risk. The patient was given the options of postponing their procedure. All of the risks, benefits, and alternatives were discussed. The patient does  wish to proceed with the procedure. PHYSICAL EXAMINATION:  VS: per nursing note  General: No acute distress. Eyes: Sclera anicteric. ENT: No oral lesions. Thyroid normal.  Nodes: No adenopathy. Skin: No spider angiomata. No jaundice. No palmar erythema. Respiratory: Lungs clear to auscultation. Cardiovascular: Regular heart rate. No murmurs. No JVD. Abdomen: Soft non-tender, liver size normal to percussion/palpation. Spleen not palpable. No obvious ascites. Extremities: No edema. No muscle wasting. No gross arthritic changes. Neurologic: Alert and oriented. Cranial nerves grossly intact. No asterixis. LABS:  Lab Results   Component Value Date/Time    WBC 8.7 08/02/2017 03:41 PM    HGB 15.7 08/02/2017 03:41 PM    HCT 46.7 08/02/2017 03:41 PM    PLATELET 848 68/27/9914 03:41 PM    MCV 87.1 08/02/2017 03:41 PM     Lab Results   Component Value Date/Time    INR 1.0 06/04/2018 07:38 AM    INR 1.0 08/02/2017 03:41 PM    Prothrombin time 12.3 06/04/2018 07:38 AM    Prothrombin time 12.8 08/02/2017 03:41 PM       ASSESSMENT AND PLAN:  Liver biopsy under ultrasound guidance.     Emperatriz Lane MD  [address]  [address] [place] [address], [address] [place] [address], [address]  12 [facility]

## 2020-10-27 NOTE — DISCHARGE INSTRUCTIONS
Yvan Mcclendon MD, Garry Rojas MD, MPH      Danelle Finn, [place], [facility]     Leeanna Buck, [facility]   Shayla Martinezjose j P-C    Cecilytrisha Stewartyeison, [facility]       Berto Ashley [facility] 136    at [address], [address] Abr[address]  22.    [phone]    FAX: [address] [place] [address], [address]    [phone]    FAX: [phone]         LIVER BIOPSY DISCHARGE INSTRUCTIONS      Shy Mclaughlin  1975  Date: 10/27/2020    DIET:    Dewain Fraction may resume your previous diet. ACTIVITIES:  Rest quietly the rest of today. You should not lift any objects more than 20 pounds for the next 2 days. If you work sitting down without strenuous activity you may return to work tomorrow. If you exert yourself or do heavy lifting at work you should take tomorrow off. Do not drive or operate hazardous machinery for 12 hours after you are discharged from this procedure. SPECIAL INSTRUCTIONS:  Do not use any aspirin or non-steroidal (Motin, Advil, Naproxen, etc) pain medications for the next 2 days. You may use extra-strength Tylenol (acetaminophen) if you experience pain or discomfort later today. Restarting blood thinners: If you were taking blood thinners prior to the procedure you can restart these in 2 days. Call the [address] office if you experience any of the following:  Persistent or severe abdominal pain. Persistent or severe abdominal distention. Fever and chills   Nausea and vomiting. New or unusual symptoms. Follow-up care: You should have a follow up appointment with Dr. Jose M Gamboa to review the results of the liver biopsy results in 2 weeks.   If you do not have an appointment please call the office at the number listed above to schedule this. Other instructions: If you have any problems or questions call the [facility] office at the phone number listed above. DISCHARGE SUMMARY from Nurse: The following personal items collected during your admission are returned to you:   Dental Appliance:    Vision: Visual Aid: With patient, Glasses  Hearing Aid:    Jewelry:    Clothing:    Other Valuables:    Valuables sent to safe:            Learning About Coronavirus (COVID-19)  Coronavirus (COVID-19): Overview  What is coronavirus (PQUJA-74)? The coronavirus disease (COVID-19) is caused by a virus. It is an illness that was first found in [place], [place], in December 2019. It has since spread worldwide. The virus can cause fever, cough, and trouble breathing. In severe cases, it can cause pneumonia and make it hard to breathe without help. It can cause death. Coronaviruses are a large group of viruses. They cause the common cold. They also cause more serious illnesses like Middle East respiratory syndrome (MERS) and severe acute respiratory syndrome (SARS). COVID-19 is caused by a novel coronavirus. That means it's a new type that has not been seen in people before. This virus spreads person-to-person through droplets from coughing and sneezing. It can also spread when you are close to someone who is infected. And it can spread when you touch something that has the virus on it, such as a doorknob or a tabletop. What can you do to protect yourself from coronavirus (COVID-19)? The best way to protect yourself from getting sick is to:  · Avoid areas where there is an outbreak. · Avoid contact with people who may be infected. · Wash your hands often with soap or alcohol-based hand sanitizers. · Avoid crowds and try to stay at least 6 feet away from other people. · Wash your hands often, especially after you cough or sneeze.  Use soap and water, and scrub for at least 20 seconds. If soap and water aren't available, use an alcohol-based hand . · Avoid touching your mouth, nose, and eyes. What can you do to avoid spreading the virus to others? To help avoid spreading the virus to others:  · Cover your mouth with a tissue when you cough or sneeze. Then throw the tissue in the trash. · Use a disinfectant to clean things that you touch often. · Stay home if you are sick or have been exposed to the virus. Don't go to school, work, or public areas. And don't use public transportation. · If you are sick:  ? Leave your home only if you need to get medical care. But call the doctor's office first so they know you're coming. And wear a face mask, if you have one.  ? If you have a face mask, wear it whenever you're around other people. It can help stop the spread of the virus when you cough or sneeze. ? Clean and disinfect your home every day. Use household  and disinfectant wipes or sprays. Take special care to clean things that you grab with your hands. These include doorknobs, remote controls, phones, and handles on your refrigerator and microwave. And don't forget countertops, tabletops, bathrooms, and computer keyboards. When to call for help  Call 911 anytime you think you may need emergency care. For example, call if:  · You have severe trouble breathing. (You can't talk at all.)  · You have constant chest pain or pressure. · You are severely dizzy or lightheaded. · You are confused or can't think clearly. · Your face and lips have a blue color. · You pass out (lose consciousness) or are very hard to wake up. Call your doctor now if you develop symptoms such as:  · Shortness of breath. · Fever. · Cough. If you need to get care, call ahead to the doctor's office for instructions before you go. Make sure you wear a face mask, if you have one, to prevent exposing other people to the virus. Where can you get the latest information?   The following health organizations are tracking and studying this virus. Their websites contain the most up-to-date information. Marisel Mahoney also learn what to do if you think you may have been exposed to the virus. · U.S. Centers for Disease Control and Prevention (CDC): The CDC provides updated news about the disease and travel advice. The website also tells you how to prevent the spread of infection. www.cdc.gov  · World Health Organization [facility]): WHO offers information about the virus outbreaks. WHO also has travel advice. www.who.int  Current as of: April 1, 2020               Content Version: 12.4  © 7020-9589 [employer], [employer]. Care instructions adapted under license by your healthcare professional. If you have questions about a medical condition or this instruction, always ask your healthcare professional. [address] any warranty or liability for your use of this information. DISCHARGE SUMMARY from Nurse    PATIENT INSTRUCTIONS:    After general anesthesia or intravenous sedation, for 24 hours or while taking prescription Narcotics:  · Limit your activities  · Do not drive and operate hazardous machinery  · Do not make important personal or business decisions  · Do  not drink alcoholic beverages  · If you have not urinated within 8 hours after discharge, please contact your surgeon on call. Report the following to your surgeon:  · Excessive pain, swelling, redness or odor of or around the surgical area  · Temperature over 100.5  · Nausea and vomiting lasting longer than 4 hours or if unable to take medications  · Any signs of decreased circulation or nerve impairment to extremity: change in color, persistent  numbness, tingling, coldness or increase pain  · Any questions    What to do at Home:  Recommended activity: Activity as tolerated and no driving for today. *  Please give a list of your current medications to your Primary Care Provider.     *  Please update this list whenever your medications are discontinued, doses are      changed, or new medications (including over-the-counter products) are added. *  Please carry medication information at all times in case of emergency situations. These are general instructions for a healthy lifestyle:    No smoking/ No tobacco products/ Avoid exposure to second hand smoke  Surgeon General's Warning:  Quitting smoking now greatly reduces serious risk to your health. Obesity, smoking, and sedentary lifestyle greatly increases your risk for illness    A healthy diet, regular physical exercise & weight monitoring are important for maintaining a healthy lifestyle    You may be retaining fluid if you have a history of heart failure or if you experience any of the following symptoms:  Weight gain of 3 pounds or more overnight or 5 pounds in a week, increased swelling in our hands or feet or shortness of breath while lying flat in bed. Please call your doctor as soon as you notice any of these symptoms; do not wait until your next office visit. The discharge information has been reviewed with the patient and spouse. The patient and spouse verbalized understanding. Discharge medications reviewed with the patient and spouse and appropriate educational materials and side effects teaching were provided.   ________________________  Patient armband removed and shredded  ___________________________________________________________________________________________________________

## 2020-10-27 NOTE — PROCEDURES
[address] [facility], MD, [address], [place]ite Raul Terry, Ginger Carbajal MD, MPH      VA Sanz, [facility]     Leeanna Buck, [facility]   Clarisa Garcia [facility]-JAYSON Oconnell, [facility]       Berto Ashley [facility] 136    at [address], [facility] Abr[place] Santillan  22.    [phone]    FAX: [address] [place] [address] [address]    [phone]    FAX: [phone]         LIVER BIOPSY PROCEDURE NOTE    Daren Lr  1975    INDICATIONS/PRE-OPERATIVE  DIAGNOSIS:  NAFLD    : Vega Yanes MD    SURGICAL ASSISTANT:  None    PROSTHETIC DEVICES, TISSUE GRAFTS, TRANSPLANTED ORGANS:  Not applicable    SEDATION: 1% Lidocaine injection 15 ml    PROCEDURE:  Informed consent to perform the procedure was obtained from the patient. The patient was positioned on the edge of the stretcher lying flat in the supine position. Ultrasound was utilized to image the liver. The diaphragm and any major mass lesion or vascular structures within the liver were identified. An appropriate site for liver biopsy was identified. The distance from the surface of the skin to the liver capsule was 5 cm. This area was prepped with betadine and draped in sterile fashion. The skin was infiltrated with 1% lidocaine. The deeper subcutanous tissues and liver capsule overlying the biopsy site were then infiltrated with 1% lidocaine until appropriate anesthesia was obtained. A small incision was made in the skin so the biopsy devise could be easily inserted. A total of 3 passes with the 16 gauge Bard biopsy devise was then made into the liver. Core(s) of liver tissue totaling 4 cm in length were obtained and placed into tissue fixative.   A band aid was placed over the biopsy site. The patient was then repositioned on the right side and transported to the recovery area on the stretcher for routine monitoring until discharge. The specimen was sent to pathology for processing via the normal transport mechanism. SPECIMEN REMOVED: Liver    ESTIMATED BLOOD LOSS: Negligible.      POST-OPERATIVE DIAGNOSIS: Same as Pre-operative Diagnosis      Caryl Mccurdy MD  [address]  [address] [place] [address] 58, 300 [facility] [address]  [address] [place] [address]

## 2020-11-02 ENCOUNTER — APPOINTMENT (OUTPATIENT)
Dept: HEMATOLOGY | Age: 45
End: 2020-11-02

## 2020-11-02 ENCOUNTER — HOSPITAL ENCOUNTER (OUTPATIENT)
Dept: LAB | Age: 45
Discharge: HOME | End: 2020-11-02

## 2020-11-02 PROCEDURE — 99000 SPECIMEN HANDLING OFFICE-LAB: CPT

## 2020-11-11 ENCOUNTER — TELEPHONE (OUTPATIENT)
Dept: HEMATOLOGY | Age: 45
End: 2020-11-11

## 2020-11-11 NOTE — TELEPHONE ENCOUNTER
Called patient. Verified patient name and date of birth. Informed patient that after receiving repeat CK-MB that unfortunately at this time that lab is excluding him from moving on in this study, Kumar [phone]. Informed patient that we would keep him on our list and would be in contact with him at the beginning of the year for possible screening for a different trial. Patient voiced understanding and had no further questions.

## 2021-01-08 ENCOUNTER — DOCUMENTATION ONLY (OUTPATIENT)
Dept: HEMATOLOGY | Age: 46
End: 2021-01-08

## 2021-01-08 NOTE — PROGRESS NOTES
Research Nurse call to patient referred by MD/NP for a clinical trial.  Clinical trial screening process,study procedures, and medication discussed.  Patient is interest in the study at this time and will be scheduled for a screening visit into [facility] [address] [place] [address]

## 2021-01-27 ENCOUNTER — OFFICE VISIT (OUTPATIENT)
Dept: HEMATOLOGY | Age: 46
End: 2021-01-27
Payer: OTHER GOVERNMENT

## 2021-01-27 ENCOUNTER — TRANSCRIBE ORDER (OUTPATIENT)
Dept: REGISTRATION | Age: 46
End: 2021-01-27

## 2021-01-27 ENCOUNTER — HOSPITAL ENCOUNTER (OUTPATIENT)
Dept: NON INVASIVE DIAGNOSTICS | Age: 46
Discharge: HOME | End: 2021-01-27

## 2021-01-27 ENCOUNTER — HOSPITAL ENCOUNTER (OUTPATIENT)
Dept: LAB | Age: 46
Discharge: HOME | End: 2021-01-27

## 2021-01-27 DIAGNOSIS — Z00.6 EXAMINATION OF PARTICIPANT IN CLINICAL TRIAL: Primary | ICD-10-CM

## 2021-01-27 DIAGNOSIS — Z00.6 EXAMINATION OF PARTICIPANT IN CLINICAL TRIAL: ICD-10-CM

## 2021-01-27 LAB
ATRIAL RATE: 77 BPM
CALCULATED P AXIS, ECG09: 32 DEGREES
CALCULATED R AXIS, ECG10: 31 DEGREES
CALCULATED T AXIS, ECG11: 42 DEGREES
DIAGNOSIS, 93000: NORMAL
P-R INTERVAL, ECG05: 168 MS
Q-T INTERVAL, ECG07: 384 MS
QRS DURATION, ECG06: 112 MS
QTC CALCULATION (BEZET), ECG08: 434 MS
VENTRICULAR RATE, ECG03: 77 BPM

## 2021-01-27 PROCEDURE — 93005 ELECTROCARDIOGRAM TRACING: CPT

## 2021-01-27 PROCEDURE — 99213 OFFICE O/P EST LOW 20 MIN: CPT | Performed by: NURSE PRACTITIONER

## 2021-01-27 PROCEDURE — 99000 SPECIMEN HANDLING OFFICE-LAB: CPT

## 2021-01-27 NOTE — PROGRESS NOTES
[address] [facility], MD, Mirella Parrish MD, MPH      Deniz Mccullough, [place], MIKEP-TESS Arizmendi-SABRINA Guerrero AGPCNP-LUI Ashley [facility] 136    at [address], [facility] Abran Santillan  22.    [phone]    FAX: [address] [place] [address] [address], [address] [phone]    FAX: [phone]     Patient was seen today as part of clinical research screening for management of GARCIA. Updated history and physical examination per protocol was performed as well as lab studies. Symptoms reported at this time include: NONE    Patient to follow-up per protocol.       TRAVIS Cox  [address] Liver [place] [address]  [facility], [address] [phone]

## 2021-01-29 ENCOUNTER — TRANSCRIBE ORDER (OUTPATIENT)
Dept: SCHEDULING | Age: 46
End: 2021-01-29

## 2021-01-29 DIAGNOSIS — Z00.6 EXAMINATION OF PARTICIPANT IN CLINICAL TRIAL: Primary | ICD-10-CM

## 2021-02-10 ENCOUNTER — HOSPITAL ENCOUNTER (OUTPATIENT)
Dept: MRI IMAGING | Age: 46
Discharge: HOME | End: 2021-02-10
Attending: INTERNAL MEDICINE
Payer: OTHER GOVERNMENT

## 2021-02-10 DIAGNOSIS — Z00.6 EXAMINATION OF PARTICIPANT IN CLINICAL TRIAL: ICD-10-CM

## 2021-02-10 PROCEDURE — 74181 MRI ABDOMEN W/O CONTRAST: CPT

## 2021-03-17 ENCOUNTER — DOCUMENTATION ONLY (OUTPATIENT)
Dept: HEMATOLOGY | Age: 46
End: 2021-03-17

## 2021-03-17 NOTE — PROGRESS NOTES
Research nurse call to patient to discuss exclusion into [place] trial due to [place] reading liver biopsy at Stage 1a in a stage 2-3 trial.  Patient verbalized understanding of 3/22 appointment cancelation. Nurse noted that appt. Had been cancelled by \"patient initiated\" during confirmation call, possibly in error before this call. Nurse to call patient when new start-up study for Hanmi F1-F3 is available which should be soon or will schedule for [facility] f/u in clinic. Patient verbalized understanding and will await return call.

## 2021-05-18 ENCOUNTER — HOSPITAL ENCOUNTER (OUTPATIENT)
Dept: LAB | Age: 46
Discharge: HOME | End: 2021-05-18
Payer: OTHER GOVERNMENT

## 2021-05-18 ENCOUNTER — OFFICE VISIT (OUTPATIENT)
Dept: HEMATOLOGY | Age: 46
End: 2021-05-18
Payer: OTHER GOVERNMENT

## 2021-05-18 VITALS
RESPIRATION RATE: 16 BRPM | DIASTOLIC BLOOD PRESSURE: 69 MMHG | BODY MASS INDEX: 41.8 KG/M2 | SYSTOLIC BLOOD PRESSURE: 111 MMHG | HEIGHT: 70 IN | HEART RATE: 97 BPM | WEIGHT: 292 LBS | TEMPERATURE: 97.4 F | OXYGEN SATURATION: 97 %

## 2021-05-18 DIAGNOSIS — K75.81 NASH (NONALCOHOLIC STEATOHEPATITIS): Primary | ICD-10-CM

## 2021-05-18 DIAGNOSIS — K75.81 NASH (NONALCOHOLIC STEATOHEPATITIS): ICD-10-CM

## 2021-05-18 LAB
ALBUMIN SERPL-MCNC: 4.2 G/DL (ref 3.4–5)
ALBUMIN/GLOB SERPL: 1.4 {RATIO} (ref 0.8–1.7)
ALP SERPL-CCNC: 88 U/L (ref 45–117)
ALT SERPL-CCNC: 109 U/L (ref 16–61)
ANION GAP SERPL CALC-SCNC: 4 MMOL/L (ref 3–18)
AST SERPL-CCNC: 32 U/L (ref 10–38)
BASOPHILS # BLD: 0.1 K/UL (ref 0–0.1)
BASOPHILS NFR BLD: 1 % (ref 0–2)
BILIRUB DIRECT SERPL-MCNC: 0.1 MG/DL (ref 0–0.2)
BILIRUB SERPL-MCNC: 0.4 MG/DL (ref 0.2–1)
BUN SERPL-MCNC: 16 MG/DL (ref 7–18)
BUN/CREAT SERPL: 20 (ref 12–20)
CALCIUM SERPL-MCNC: 9.1 MG/DL (ref 8.5–10.1)
CHLORIDE SERPL-SCNC: 100 MMOL/L (ref 100–111)
CO2 SERPL-SCNC: 35 MMOL/L (ref 21–32)
CREAT SERPL-MCNC: 0.81 MG/DL (ref 0.6–1.3)
DIFFERENTIAL METHOD BLD: ABNORMAL
EOSINOPHIL # BLD: 0.3 K/UL (ref 0–0.4)
EOSINOPHIL NFR BLD: 3 % (ref 0–5)
ERYTHROCYTE [DISTWIDTH] IN BLOOD BY AUTOMATED COUNT: 14.3 % (ref 11.6–14.5)
GLOBULIN SER CALC-MCNC: 3.1 G/DL (ref 2–4)
GLUCOSE SERPL-MCNC: 175 MG/DL (ref 74–99)
HCT VFR BLD AUTO: 50.4 % (ref 36–48)
HGB BLD-MCNC: 16.4 G/DL (ref 13–16)
LYMPHOCYTES # BLD: 2.2 K/UL (ref 0.9–3.6)
LYMPHOCYTES NFR BLD: 24 % (ref 21–52)
MCH RBC QN AUTO: 28.4 PG (ref 24–34)
MCHC RBC AUTO-ENTMCNC: 32.5 G/DL (ref 31–37)
MCV RBC AUTO: 87.3 FL (ref 74–97)
MONOCYTES # BLD: 0.6 K/UL (ref 0.05–1.2)
MONOCYTES NFR BLD: 6 % (ref 3–10)
NEUTS SEG # BLD: 5.9 K/UL (ref 1.8–8)
NEUTS SEG NFR BLD: 66 % (ref 40–73)
PLATELET # BLD AUTO: 337 K/UL (ref 135–420)
PMV BLD AUTO: 10.4 FL (ref 9.2–11.8)
POTASSIUM SERPL-SCNC: 3.9 MMOL/L (ref 3.5–5.5)
PROT SERPL-MCNC: 7.3 G/DL (ref 6.4–8.2)
RBC # BLD AUTO: 5.77 M/UL (ref 4.35–5.65)
SODIUM SERPL-SCNC: 139 MMOL/L (ref 136–145)
WBC # BLD AUTO: 9 K/UL (ref 4.6–13.2)

## 2021-05-18 PROCEDURE — 80076 HEPATIC FUNCTION PANEL: CPT

## 2021-05-18 PROCEDURE — 99213 OFFICE O/P EST LOW 20 MIN: CPT | Performed by: NURSE PRACTITIONER

## 2021-05-18 PROCEDURE — 36415 COLL VENOUS BLD VENIPUNCTURE: CPT

## 2021-05-18 PROCEDURE — 85025 COMPLETE CBC W/AUTO DIFF WBC: CPT

## 2021-05-18 PROCEDURE — 80048 BASIC METABOLIC PNL TOTAL CA: CPT

## 2021-05-18 NOTE — PROGRESS NOTES
Jarod Handing [address]      Gena Graham MD, Melida Diallo, Earnest Menon MD, MPH      Jai Kim, PA-JAYSON Vu, [facility]     Leeanna Buck, [facility]   Corby Samanthadi, FNP-C    Elisha Storeya, [facility]       [facility] 136    at [address], [address] [facility], [facility] 22.    [phone]    FAX: [address] [place] [address], [address]    [phone]    FAX: [phone]       Patient Care Team:  Callie Smith NP as PCP - General (Nurse Practitioner)      Problem List  Date Reviewed: 1/27/2021          Codes Class Noted    GARCIA (nonalcoholic steatohepatitis) ICD-10-CM: K75.81  ICD-9-CM: 571.8  8/2/2017        Type II diabetes mellitus ([facility] 75.) ICD-10-CM: E11.9  ICD-9-CM: 250.00  8/2/2017        Hypercholesterolemia ICD-10-CM: E78.00  ICD-9-CM: 272.0  8/2/2017        SHIRA on CPAP ICD-10-CM: G47.33, Z99.89  ICD-9-CM: 327.23, V46.8  8/2/2017        Hypertension ICD-10-CM: I10  ICD-9-CM: 401.9  8/2/2017              Thornell Goldmann returns to the [address] for management of non-alcoholic steatohepatitis (GARCIA). The active problem list, all pertinent past medical history, medications, liver histology, radiologic findings and laboratory findings related to the liver disorder were reviewed with the patient. The patient is a 39 y.o. male that was diagnosed with GARCIA with pericellular fibrosis in 9/2017. He has been participating in clinical trials for the treatment of GARCIA since 2017. The patient has no symptoms which could be attributed to the liver disorder. The patient completes all daily activities without any functional limitations.       The patient has not experienced fatigue, pain in the right side over the liver,       ALLERGIES  No Known Allergies    MEDICATIONS  Current Outpatient Medications   Medication Sig    empagliflozin (Jardiance) 25 mg tablet Take  by mouth daily.  metFORMIN (GLUCOPHAGE) 500 mg tablet Take 500 mg by mouth two (2) times daily (with meals).  atorvastatin (LIPITOR) 10 mg tablet Take 10 mg by mouth daily.  multivitamin (ONE A DAY) tablet Take 1 Tab by mouth daily.  lisinopril-hydroCHLOROthiazide (PRINZIDE, ZESTORETIC) 20-25 mg per tablet Take  by mouth daily.  loratadine (CLARITIN) 10 mg tablet Take 10 mg by mouth daily. No current facility-administered medications for this visit. SYSTEM REVIEW NOT RELATED TO LIVER DISEASE OR REVIEWED ABOVE:  Constitution systems: Negative for fever, chills, weight gain, weight loss. Eyes: Negative for visual changes. ENT: Negative for sore throat, painful swallowing. Respiratory: Negative for cough, hemoptysis, SOB. Cardiology: Negative for chest pain, palpitations. GI:  Negative for constipation or diarrhea. : Negative for urinary frequency, dysuria, hematuria, nocturia. Skin: Negative for rash. Hematology: Negative for easy bruising, blood clots. Musculo-skelatal: Negative for back pain, muscle pain, weakness. Neurologic: Negative for headaches, dizziness, vertigo, memory problems not related to HE. Psychology: Negative for anxiety, depression. FAMILY HISTORY:  The father has the following chronic diseases: DM. The mother has the following chronic diseases: DM. There is no family history of liver disease. SOCIAL HISTORY:  The patient is . The patient has 2 children, a son  in a MVA. The patient has never used tobacco products. The patient has never consumed significant amounts of alcohol. The patient currently works full time at [employer].         PHYSICAL EXAMINATION:  Visit Vitals  /69 (BP 1 Location: Left upper arm, BP Patient Position: Sitting, BP Cuff Size: Large adult)   Pulse 97   Temp 97.4 °F (36.3 °C)   Resp 16   Ht 5' 9.5\" (1.765 m)   Wt 292 lb (132.5 kg)   SpO2 97%   BMI 42.50 kg/m²     General: No acute distress. Eyes: Sclera anicteric. ENT: No oral lesions. Thyroid normal.  Nodes: No adenopathy. Skin: No spider angiomata. No jaundice. No palmar erythema. Respiratory: Lungs clear to auscultation. Cardiovascular: Regular heart rate. No murmurs. No JVD. Abdomen: Soft non-tender. Liver size normal to percussion/palpation. Spleen not palpable. No obvious ascites. Extremities: No edema. No muscle wasting. No gross arthritic changes. Neurologic: Alert and oriented. Cranial nerves grossly intact. No asterixis. LABORATORY STUDIES:  Liver [place] [address] Ref Rng & Units 8/2/2017   WBC 4.6 - 13.2 K/uL 8.7   ANC 1.8 - 8.0 K/UL 6.0   HGB 13.0 - 16.0 g/dL 15.7    - 420 K/uL 355   INR 0.8 - 1.2   1.0   AST 15 - 37 U/L 84 (H)   ALT 16 - 61 U/L 222 (H)   Alk Phos 45 - 117 U/L 55   Bili, Total 0.2 - 1.0 MG/DL 0.7   Bili, Direct 0.0 - 0.2 MG/DL 0.1   Albumin 3.4 - 5.0 g/dL 4.2   BUN 7.0 - 18 MG/DL 16   Creat 0.6 - 1.3 MG/DL 0.92   Na 136 - 145 mmol/L 140   K 3.5 - 5.5 mmol/L 4.0   Cl 100 - 108 mmol/L 98 (L)   CO2 21 - 32 mmol/L 31   Glucose 74 - 99 mg/dL 112 (H)     SEROLOGIES:  2/2017. HBsurface antigen negative, anti-HCV negative, Ferritin 567, CARMELITA negative,     Serologies Latest Ref Rng & Units 8/2/2017   Hep A Ab, Total NEGATIVE   Positive (A)   Hep B Core Ab, Total NEGATIVE   NEGATIVE   Hep B Surface Ab >10.0 mIU/mL <3.10 (L)   Hep B Surface Ab Interp POS   NEGATIVE (A)   Ferritin 8 - 388 NG/ (H)   Iron % Saturation % 37   ASMCA 0 - 19 Units 9   Ceruloplasmin 16.0 - 31.0 mg/dL 28.5   Alpha-1 antitrypsin level 90 - 200 mg/dL 153     LIVER HISTOLOGY:  9/2017. Slides reviewed by BARRETT. GARCIA. 66-75% macro and micovesicular steatosis. Mild ballooning. Mild inflammation. Stage 2 septal fibrosis.   DARRION (311).    ENDOSCOPIC PROCEDURES:  Not available or performed    RADIOLOGY:  2/2021. MRI of the liver. Changes consistent with fatty liver. No liver mass lesions. OTHER TESTING:  Not available or performed    ASSESSMENT AND PLAN:  GARCIA with stage 2 septal fibrosis. Will perform laboratory testing to monitor liver function and degree of liver injury. This included   BMP, hepatic panel, CBC with platelet count. Liver transaminases are elevated. Alkaline phosphate is normal. Liver function is normal. The platelet count is normal.      Serologic and virologic studies to assess for other causes of chronic liver disease are negative. The patient was counseled regarding diet and exercise to achieve weight loss. The best diet for patients with fatty liver is one very low in carbohydrates and enriched with protein such as an Michelle's program.      The patient was told not to consume any food products and drinks containing fructose as this enhances hepatic fat synthesis. There is no medication of vitamin supplements that we advocate for GARCIA. Using glitazones in patients without diabetes mellitus has been shown to reduce fat content in the liver but has no effect on fibrosis and is associated with weight gain. Vitamin E has also been used but the data is not very good and most experts no longer advocate this. The only medical treatments for GARCIA are through clinical trials. The patient was offered participation in a clinical trial for treatment of GARCIA. The patient would like to particiapte in a clinical trial.    We will continue to monitor the patient at periodic intervals. If weight loss is successful the fat will resolve from the liver and liver enzymes should return to the normal range. We would then repeat an ultrasound to see if this also returns to normal.  If liver enzymes do not return to normal with weight reduction additional evaluation may be necessary over time.       The patient was directed to continue all current medications at the current dosages. There are no contraindications for the patient to take any medications that are necessary for treatment of other medical issues including medications for diabetes mellitus and hypercholesterolemia. The patient was counseled regarding alcohol consumption and that this could contribute to fatty liver disease. Vaccination for viral hepatitis B is recommended since the patient has no serologic evidence of previous exposure or vaccination with immunity. Vaccination for viral hepatitis A is not needed. The patient has serologic evidence of prior exposure or vaccination with immunity. Routine vaccinations against other bacterial and viral agents can be performed as indicated. Annual flu vaccination should be administered if indicated. FOLLOW-UP:  All of the issues listed above in the Assessment and Plan were discussed with the patient. All questions were answered. The patient expressed a clear understanding of the above.     The patient was given a follow-up appointment for 6 months in case he decides not to enter or is excluded from entering the clinical trial.    Heldaio Christie, AGPCNP-BC  [address], [address]  [phone]

## 2021-07-16 LAB
CREATININE, EXTERNAL: 0.69
HBA1C MFR BLD HPLC: 8.9 %
LDL-C, EXTERNAL: 66

## 2021-11-18 ENCOUNTER — HOSPITAL ENCOUNTER (OUTPATIENT)
Dept: LAB | Age: 46
Discharge: HOME | End: 2021-11-18
Payer: OTHER GOVERNMENT

## 2021-11-18 ENCOUNTER — OFFICE VISIT (OUTPATIENT)
Dept: HEMATOLOGY | Age: 46
End: 2021-11-18
Payer: OTHER GOVERNMENT

## 2021-11-18 VITALS
SYSTOLIC BLOOD PRESSURE: 115 MMHG | HEART RATE: 81 BPM | HEIGHT: 70 IN | BODY MASS INDEX: 39.94 KG/M2 | WEIGHT: 279 LBS | OXYGEN SATURATION: 98 % | DIASTOLIC BLOOD PRESSURE: 72 MMHG | TEMPERATURE: 97.3 F | RESPIRATION RATE: 20 BRPM

## 2021-11-18 DIAGNOSIS — K75.81 NASH (NONALCOHOLIC STEATOHEPATITIS): Primary | ICD-10-CM

## 2021-11-18 DIAGNOSIS — K75.81 NASH (NONALCOHOLIC STEATOHEPATITIS): ICD-10-CM

## 2021-11-18 LAB
ALBUMIN SERPL-MCNC: 4 G/DL (ref 3.4–5)
ALBUMIN/GLOB SERPL: 1.2 {RATIO} (ref 0.8–1.7)
ALP SERPL-CCNC: 53 U/L (ref 45–117)
ALT SERPL-CCNC: 115 U/L (ref 16–61)
ANION GAP SERPL CALC-SCNC: 8 MMOL/L (ref 3–18)
AST SERPL-CCNC: 38 U/L (ref 10–38)
BASOPHILS # BLD: 0.1 K/UL (ref 0–0.1)
BASOPHILS NFR BLD: 1 % (ref 0–2)
BILIRUB DIRECT SERPL-MCNC: 0.2 MG/DL (ref 0–0.2)
BILIRUB SERPL-MCNC: 0.5 MG/DL (ref 0.2–1)
BUN SERPL-MCNC: 14 MG/DL (ref 7–18)
BUN/CREAT SERPL: 18 (ref 12–20)
CALCIUM SERPL-MCNC: 9.5 MG/DL (ref 8.5–10.1)
CHLORIDE SERPL-SCNC: 101 MMOL/L (ref 100–111)
CO2 SERPL-SCNC: 29 MMOL/L (ref 21–32)
CREAT SERPL-MCNC: 0.79 MG/DL (ref 0.6–1.3)
DIFFERENTIAL METHOD BLD: ABNORMAL
EOSINOPHIL # BLD: 0.1 K/UL (ref 0–0.4)
EOSINOPHIL NFR BLD: 1 % (ref 0–5)
ERYTHROCYTE [DISTWIDTH] IN BLOOD BY AUTOMATED COUNT: 14.5 % (ref 11.6–14.5)
GLOBULIN SER CALC-MCNC: 3.4 G/DL (ref 2–4)
GLUCOSE SERPL-MCNC: 101 MG/DL (ref 74–99)
HCT VFR BLD AUTO: 49.3 % (ref 36–48)
HGB BLD-MCNC: 16.1 G/DL (ref 13–16)
IMM GRANULOCYTES # BLD AUTO: 0 K/UL
IMM GRANULOCYTES NFR BLD AUTO: 0 %
LYMPHOCYTES # BLD: 3.1 K/UL (ref 0.9–3.6)
LYMPHOCYTES NFR BLD: 33 % (ref 21–52)
MCH RBC QN AUTO: 28.2 PG (ref 24–34)
MCHC RBC AUTO-ENTMCNC: 32.7 G/DL (ref 31–37)
MCV RBC AUTO: 86.3 FL (ref 78–100)
MONOCYTES # BLD: 0.6 K/UL (ref 0.05–1.2)
MONOCYTES NFR BLD: 6 % (ref 3–10)
NEUTS BAND NFR BLD MANUAL: 1 % (ref 0–5)
NEUTS SEG # BLD: 5.5 K/UL (ref 1.8–8)
NEUTS SEG NFR BLD: 58 % (ref 40–73)
NRBC # BLD: 0 K/UL (ref 0–0.01)
NRBC BLD-RTO: 0 PER 100 WBC
PLATELET # BLD AUTO: 340 K/UL (ref 135–420)
PMV BLD AUTO: 9.3 FL (ref 9.2–11.8)
POTASSIUM SERPL-SCNC: 3.8 MMOL/L (ref 3.5–5.5)
PROT SERPL-MCNC: 7.4 G/DL (ref 6.4–8.2)
RBC # BLD AUTO: 5.71 M/UL (ref 4.35–5.65)
RBC MORPH BLD: ABNORMAL
SODIUM SERPL-SCNC: 138 MMOL/L (ref 136–145)
WBC # BLD AUTO: 9.4 K/UL (ref 4.6–13.2)

## 2021-11-18 PROCEDURE — 36415 COLL VENOUS BLD VENIPUNCTURE: CPT

## 2021-11-18 PROCEDURE — 80076 HEPATIC FUNCTION PANEL: CPT

## 2021-11-18 PROCEDURE — 99213 OFFICE O/P EST LOW 20 MIN: CPT | Performed by: NURSE PRACTITIONER

## 2021-11-18 PROCEDURE — 80048 BASIC METABOLIC PNL TOTAL CA: CPT

## 2021-11-18 PROCEDURE — 85025 COMPLETE CBC W/AUTO DIFF WBC: CPT

## 2021-11-18 NOTE — PROGRESS NOTES
[facility] [address]      Usman Schmitz MD, Eber Woodall, Loly Iglesias MD, MPH      RENETTA Boyd-JAYSON Miller, [facility]-BC     Leeanna Buck, [facility]   Haider Councilman, FNP-C    Hyun Jesusial, [facility]       [facility]ielle [facility] 136    at [address], [address] [place] [address] 22.    [phone]    FAX: [address] [place] [address], [address]    [phone]    FAX: [phone]       Patient Care Team:  Aislinn Gupta NP as PCP - General (Nurse Practitioner)    Problem List  Date Reviewed: 5/18/2021          Codes Class Noted    GARCIA (nonalcoholic steatohepatitis) ICD-10-CM: K75.81  ICD-9-CM: 571.8  8/2/2017        Type II diabetes mellitus ([facility] ICD-10-CM: E11.9  ICD-9-CM: 250.00  8/2/2017        Hypercholesterolemia ICD-10-CM: E78.00  ICD-9-CM: 272.0  8/2/2017        SHIRA on CPAP ICD-10-CM: G47.33, Z99.89  ICD-9-CM: 327.23, V46.8  8/2/2017        Hypertension ICD-10-CM: I10  ICD-9-CM: 401.9  8/2/2017              Salomon Willingham returns to the [address] for management of non-alcoholic steatohepatitis (GARCIA). The active problem list, all pertinent past medical history, medications, liver histology, radiologic findings and laboratory findings related to the liver disorder were reviewed with the patient. The patient is a 42 y.o. male that was diagnosed with GARCIA with pericellular fibrosis in 9/2017. He has been participating in clinical trials for the treatment of GARCIA since 2017. The patient has no symptoms which could be attributed to the liver disorder. The patient completes all daily activities without any functional limitations.       The patient has not experienced fatigue, pain in the right side over the liver,       ALLERGIES  No Known Allergies    MEDICATIONS  Current Outpatient Medications   Medication Sig    semaglutide (Rybelsus) 14 mg tablet     empagliflozin (Jardiance) 25 mg tablet Take  by mouth daily.  metFORMIN (GLUCOPHAGE) 500 mg tablet Take 500 mg by mouth two (2) times daily (with meals).  atorvastatin (LIPITOR) 10 mg tablet Take 10 mg by mouth daily.  multivitamin (ONE A DAY) tablet Take 1 Tab by mouth daily.  lisinopril-hydroCHLOROthiazide (PRINZIDE, ZESTORETIC) 20-25 mg per tablet Take  by mouth daily.  loratadine (CLARITIN) 10 mg tablet Take 10 mg by mouth daily. No current facility-administered medications for this visit. SYSTEM REVIEW NOT RELATED TO LIVER DISEASE OR REVIEWED ABOVE:  Constitution systems: Negative for fever, chills, weight gain, weight loss. Eyes: Negative for visual changes. ENT: Negative for sore throat, painful swallowing. Respiratory: Negative for cough, hemoptysis, SOB. Cardiology: Negative for chest pain, palpitations. GI:  Negative for constipation or diarrhea. : Negative for urinary frequency, dysuria, hematuria, nocturia. Skin: Negative for rash. Hematology: Negative for easy bruising, blood clots. Musculo-skelatal: Negative for back pain, muscle pain, weakness. Neurologic: Negative for headaches, dizziness, vertigo, memory problems not related to HE. Psychology: Negative for anxiety, depression. FAMILY HISTORY:  The father has the following chronic diseases: DM. The mother has the following chronic diseases: DM. There is no family history of liver disease. SOCIAL HISTORY:  The patient is . The patient has 2 children, a son  in a MVA. The patient has never used tobacco products. The patient has never consumed significant amounts of alcohol. The patient currently works full time at [employer].         PHYSICAL EXAMINATION:  Visit Vitals  /72 (BP 1 Location: Left upper arm, BP Patient Position: Sitting, BP Cuff Size: Large adult)   Pulse 81   Temp 97.3 °F (36.3 °C)   Resp 20   Ht 5' 10\" (1.778 m)   Wt 279 lb (126.6 kg)   SpO2 98%   BMI 40.03 kg/m²     General: No acute distress. Eyes: Sclera anicteric. ENT: No oral lesions. Thyroid normal.  Nodes: No adenopathy. Skin: No spider angiomata. No jaundice. No palmar erythema. Respiratory: Lungs clear to auscultation. Cardiovascular: Regular heart rate. No murmurs. No JVD. Abdomen: Soft non-tender. Liver size normal to percussion/palpation. Spleen not palpable. No obvious ascites. Extremities: No edema. No muscle wasting. No gross arthritic changes. Neurologic: Alert and oriented. Cranial nerves grossly intact. No asterixis. LABORATORY STUDIES:  Liver [place] of [address] Units 5/18/2021   WBC 4.6 - 13.2 K/uL 9.0   ANC 1.8 - 8.0 K/UL 5.9   HGB 13.0 - 16.0 g/dL 16.4 (H)    - 420 K/uL 337   AST 10 - 38 U/L 32   ALT 16 - 61 U/L 109 (H)   Alk Phos 45 - 117 U/L 88   Bili, Total 0.2 - 1.0 MG/DL 0.4   Bili, Direct 0.0 - 0.2 MG/DL 0.1   Albumin 3.4 - 5.0 g/dL 4.2   BUN 7.0 - 18 MG/DL 16   Creat 0.6 - 1.3 MG/DL 0.81   Na 136 - 145 mmol/L 139   K 3.5 - 5.5 mmol/L 3.9   Cl 100 - 111 mmol/L 100   CO2 21 - 32 mmol/L 35 (H)   Glucose 74 - 99 mg/dL 175 (H)     SEROLOGIES:  2/2017. HBsurface antigen negative, anti-HCV negative, Ferritin 567, CARMELITA negative,     Serologies Latest Ref Rng & Units 8/2/2017   Hep A Ab, Total NEGATIVE   Positive (A)   Hep B Core Ab, Total NEGATIVE   NEGATIVE   Hep B Surface Ab >10.0 mIU/mL <3.10 (L)   Hep B Surface Ab Interp POS   NEGATIVE (A)   Ferritin 8 - 388 NG/ (H)   Iron % Saturation % 37   ASMCA 0 - 19 Units 9   Ceruloplasmin 16.0 - 31.0 mg/dL 28.5   Alpha-1 antitrypsin level 90 - 200 mg/dL 153     LIVER HISTOLOGY:  9/2017. Slides reviewed by MLS. GARCIA. 66-75% macro and micovesicular steatosis. Mild ballooning. Mild inflammation. Stage 2 septal fibrosis.   DARRION (311).    ENDOSCOPIC PROCEDURES:  Not available or performed    RADIOLOGY:  2/2021. MRI of the liver. Changes consistent with fatty liver. No liver mass lesions. OTHER TESTING:  Not available or performed    ASSESSMENT AND PLAN:  GARCIA with stage 2 septal fibrosis. Will perform laboratory testing to monitor liver function and degree of liver injury. This included   BMP, hepatic panel, CBC with platelet count. Liver transaminases are elevated. Alkaline phosphate is normal. Liver function is normal. The platelet count is normal.      Serologic and virologic studies to assess for other causes of chronic liver disease are negative. The patient was counseled regarding diet and exercise to achieve weight loss. The best diet for patients with fatty liver is one very low in carbohydrates and enriched with protein such as an Michelle's program.      The patient was told not to consume any food products and drinks containing fructose as this enhances hepatic fat synthesis. There is no medication of vitamin supplements that we advocate for GARCIA. Using glitazones in patients without diabetes mellitus has been shown to reduce fat content in the liver but has no effect on fibrosis and is associated with weight gain. Vitamin E has also been used but the data is not very good and most experts no longer advocate this. The only medical treatments for GARCIA are through clinical trials. The patient was offered participation in a clinical trial for treatment of GARCIA. The patient would like to particiapte in a clinical trial.    We will continue to monitor the patient at periodic intervals. If weight loss is successful the fat will resolve from the liver and liver enzymes should return to the normal range. We would then repeat an ultrasound to see if this also returns to normal.  If liver enzymes do not return to normal with weight reduction additional evaluation may be necessary over time.       The patient was directed to continue all current medications at the current dosages. There are no contraindications for the patient to take any medications that are necessary for treatment of other medical issues including medications for diabetes mellitus and hypercholesterolemia. The patient was counseled regarding alcohol consumption and that this could contribute to fatty liver disease. Vaccination for viral hepatitis B is recommended since the patient has no serologic evidence of previous exposure or vaccination with immunity. Vaccination for viral hepatitis A is not needed. The patient has serologic evidence of prior exposure or vaccination with immunity. Routine vaccinations against other bacterial and viral agents can be performed as indicated. Annual flu vaccination should be administered if indicated. FOLLOW-UP:  All of the issues listed above in the Assessment and Plan were discussed with the patient. All questions were answered. The patient expressed a clear understanding of the above. [facility] 87 in 6 months to assess for the effects of diet changes and weight loss. Fibroscan at next visit.        TESS Carbajal-BC  [address] [place] of [address], [facility] [address]  [place], [address] [place] [address] Street [address] [phone]

## 2022-03-18 PROBLEM — I10 HYPERTENSION: Status: ACTIVE | Noted: 2017-08-02

## 2022-03-19 PROBLEM — G47.33 OSA ON CPAP: Status: ACTIVE | Noted: 2017-08-02

## 2022-03-19 PROBLEM — E78.00 HYPERCHOLESTEROLEMIA: Status: ACTIVE | Noted: 2017-08-02

## 2022-03-19 PROBLEM — Z99.89 OSA ON CPAP: Status: ACTIVE | Noted: 2017-08-02

## 2022-03-19 PROBLEM — E11.9 TYPE II DIABETES MELLITUS (HCC): Status: ACTIVE | Noted: 2017-08-02

## 2022-03-20 PROBLEM — K75.81 NASH (NONALCOHOLIC STEATOHEPATITIS): Status: ACTIVE | Noted: 2017-08-02

## 2022-06-09 ENCOUNTER — HOSPITAL ENCOUNTER (OUTPATIENT)
Dept: LAB | Age: 47
Discharge: HOME | End: 2022-06-09

## 2022-06-09 ENCOUNTER — OFFICE VISIT (OUTPATIENT)
Dept: HEMATOLOGY | Age: 47
End: 2022-06-09
Payer: OTHER GOVERNMENT

## 2022-06-09 VITALS
TEMPERATURE: 97.1 F | DIASTOLIC BLOOD PRESSURE: 77 MMHG | SYSTOLIC BLOOD PRESSURE: 115 MMHG | BODY MASS INDEX: 40.8 KG/M2 | HEART RATE: 83 BPM | OXYGEN SATURATION: 98 % | WEIGHT: 284.38 LBS

## 2022-06-09 DIAGNOSIS — K75.81 NASH (NONALCOHOLIC STEATOHEPATITIS): ICD-10-CM

## 2022-06-09 DIAGNOSIS — K75.81 NASH (NONALCOHOLIC STEATOHEPATITIS): Primary | ICD-10-CM

## 2022-06-09 LAB
ALBUMIN SERPL-MCNC: 4.1 G/DL (ref 3.4–5)
ALBUMIN/GLOB SERPL: 1.3 {RATIO} (ref 0.8–1.7)
ALP SERPL-CCNC: 47 U/L (ref 45–117)
ALT SERPL-CCNC: 84 U/L (ref 16–61)
ANION GAP SERPL CALC-SCNC: 6 MMOL/L (ref 3–18)
AST SERPL-CCNC: 28 U/L (ref 10–38)
BASOPHILS # BLD: 0.1 K/UL (ref 0–0.1)
BASOPHILS NFR BLD: 1 % (ref 0–2)
BILIRUB DIRECT SERPL-MCNC: 0.2 MG/DL (ref 0–0.2)
BILIRUB SERPL-MCNC: 0.7 MG/DL (ref 0.2–1)
BUN SERPL-MCNC: 23 MG/DL (ref 7–18)
BUN/CREAT SERPL: 30 (ref 12–20)
CALCIUM SERPL-MCNC: 9.1 MG/DL (ref 8.5–10.1)
CHLORIDE SERPL-SCNC: 102 MMOL/L (ref 100–111)
CO2 SERPL-SCNC: 30 MMOL/L (ref 21–32)
CREAT SERPL-MCNC: 0.77 MG/DL (ref 0.6–1.3)
DIFFERENTIAL METHOD BLD: ABNORMAL
EOSINOPHIL # BLD: 0.2 K/UL (ref 0–0.4)
EOSINOPHIL NFR BLD: 3 % (ref 0–5)
ERYTHROCYTE [DISTWIDTH] IN BLOOD BY AUTOMATED COUNT: 15.2 % (ref 11.6–14.5)
GLOBULIN SER CALC-MCNC: 3.2 G/DL (ref 2–4)
GLUCOSE SERPL-MCNC: 113 MG/DL (ref 74–99)
HCT VFR BLD AUTO: 49.7 % (ref 36–48)
HGB BLD-MCNC: 15.9 G/DL (ref 13–16)
IMM GRANULOCYTES # BLD AUTO: 0 K/UL (ref 0–0.04)
IMM GRANULOCYTES NFR BLD AUTO: 0 % (ref 0–0.5)
LYMPHOCYTES # BLD: 1.9 K/UL (ref 0.9–3.6)
LYMPHOCYTES NFR BLD: 24 % (ref 21–52)
MCH RBC QN AUTO: 28.5 PG (ref 24–34)
MCHC RBC AUTO-ENTMCNC: 32 G/DL (ref 31–37)
MCV RBC AUTO: 89.2 FL (ref 78–100)
MONOCYTES # BLD: 0.5 K/UL (ref 0.05–1.2)
MONOCYTES NFR BLD: 6 % (ref 3–10)
NEUTS SEG # BLD: 5.2 K/UL (ref 1.8–8)
NEUTS SEG NFR BLD: 66 % (ref 40–73)
NRBC # BLD: 0 K/UL (ref 0–0.01)
NRBC BLD-RTO: 0 PER 100 WBC
PLATELET # BLD AUTO: 308 K/UL (ref 135–420)
PMV BLD AUTO: 10.2 FL (ref 9.2–11.8)
POTASSIUM SERPL-SCNC: 3.8 MMOL/L (ref 3.5–5.5)
PROT SERPL-MCNC: 7.3 G/DL (ref 6.4–8.2)
RBC # BLD AUTO: 5.57 M/UL (ref 4.35–5.65)
SODIUM SERPL-SCNC: 138 MMOL/L (ref 136–145)
WBC # BLD AUTO: 7.9 K/UL (ref 4.6–13.2)

## 2022-06-09 PROCEDURE — 99213 OFFICE O/P EST LOW 20 MIN: CPT | Performed by: NURSE PRACTITIONER

## 2022-06-09 PROCEDURE — 80076 HEPATIC FUNCTION PANEL: CPT

## 2022-06-09 PROCEDURE — 85025 COMPLETE CBC W/AUTO DIFF WBC: CPT

## 2022-06-09 PROCEDURE — 80048 BASIC METABOLIC PNL TOTAL CA: CPT

## 2022-06-09 PROCEDURE — 36415 COLL VENOUS BLD VENIPUNCTURE: CPT

## 2022-06-09 PROCEDURE — 91200 LIVER ELASTOGRAPHY: CPT | Performed by: NURSE PRACTITIONER

## 2022-06-09 NOTE — PROGRESS NOTES
3340 [facility], MD, [address], [place], [place]      [place], [place], [facility]     April S Cielo, [facility]   Abena Bautista, SABRINA Cheema, [facility]       [facility]erto [facility] 136    at [address], [address] [facility], [facility] 22.    [phone]    FAX: [address] [place] [address], [address]    [phone]    FAX: [phone]     Patient Care Team:  Duane Feeler, NP as PCP - General (Nurse Practitioner)    Problem List  Date Reviewed: 6/9/2022          Codes Class Noted    GARCIA (nonalcoholic steatohepatitis) ICD-10-CM: K75.81  ICD-9-CM: 571.8  8/2/2017        Type II diabetes mellitus ([facility] 75.) ICD-10-CM: E11.9  ICD-9-CM: 250.00  8/2/2017        Hypercholesterolemia ICD-10-CM: E78.00  ICD-9-CM: 272.0  8/2/2017        SHIRA on CPAP ICD-10-CM: G47.33, Z99.89  ICD-9-CM: 327.23, V46.8  8/2/2017        Hypertension ICD-10-CM: I10  ICD-9-CM: 401.9  8/2/2017              Juan José Klein returns to the [address] for management of non-alcoholic steatohepatitis (GARCIA). The active problem list, all pertinent past medical history, medications, liver histology, radiologic findings and laboratory findings related to the liver disorder were reviewed with the patient. The patient is a 41 y.o. male that was diagnosed with GARCIA with pericellular fibrosis in 9/2017. He has been participating in clinical trials for the treatment of GARCIA since 2017. The most recent imaging of the liver was MRI performed in 2/2021. Results suggest fatty liver disease. No liver mass lesions noted. Assessment of liver fibrosis with Fibroscan was performed in the office today.  The result was 6.0 kPa which correlates with stage 1 portal fibrosis. The CAP score of 367 suggests hepatic steatosis. The patient has no symptoms which could be attributed to the liver disorder. The patient completes all daily activities without any functional limitations. The patient has not experienced fatigue, pain in the right side over the liver,       ALLERGIES  No Known Allergies    MEDICATIONS  Current Outpatient Medications   Medication Sig    semaglutide (Rybelsus) 14 mg tablet     empagliflozin (Jardiance) 25 mg tablet Take  by mouth daily.  metFORMIN (GLUCOPHAGE) 500 mg tablet Take 500 mg by mouth two (2) times daily (with meals).  atorvastatin (LIPITOR) 10 mg tablet Take 10 mg by mouth daily.  multivitamin (ONE A DAY) tablet Take 1 Tab by mouth daily.  lisinopril-hydroCHLOROthiazide (PRINZIDE, ZESTORETIC) 20-25 mg per tablet Take  by mouth daily.  loratadine (CLARITIN) 10 mg tablet Take 10 mg by mouth daily. No current facility-administered medications for this visit. SYSTEM REVIEW NOT RELATED TO LIVER DISEASE OR REVIEWED ABOVE:  Constitution systems: Negative for fever, chills, weight gain, weight loss. Eyes: Negative for visual changes. ENT: Negative for sore throat, painful swallowing. Respiratory: Negative for cough, hemoptysis, SOB. Cardiology: Negative for chest pain, palpitations. GI:  Negative for constipation or diarrhea. : Negative for urinary frequency, dysuria, hematuria, nocturia. Skin: Negative for rash. Hematology: Negative for easy bruising, blood clots. Musculo-skelatal: Negative for back pain, muscle pain, weakness. Neurologic: Negative for headaches, dizziness, vertigo, memory problems not related to HE. Psychology: Negative for anxiety, depression. FAMILY HISTORY:  The father has the following chronic diseases: DM. The mother has the following chronic diseases: DM. There is no family history of liver disease. SOCIAL HISTORY:  The patient is .     The patient has 2 children, a son  in a MVA. The patient has never used tobacco products. The patient has never consumed significant amounts of alcohol. The patient currently works full time at [employer]. PHYSICAL EXAMINATION:  Visit Vitals  /77   Pulse 83   Temp 97.1 °F (36.2 °C) (Tympanic)   Wt 284 lb 6 oz (129 kg)   SpO2 98%   BMI 40.80 kg/m²     General: No acute distress. Eyes: Sclera anicteric. ENT: No oral lesions. Thyroid normal.  Nodes: No adenopathy. Skin: No spider angiomata. No jaundice. No palmar erythema. Respiratory: Lungs clear to auscultation. Cardiovascular: Regular heart rate. No murmurs. No JVD. Abdomen: Soft non-tender. Liver size normal to percussion/palpation. Spleen not palpable. No obvious ascites. Extremities: No edema. No muscle wasting. No gross arthritic changes. Neurologic: Alert and oriented. Cranial nerves grossly intact. No asterixis. LABORATORY STUDIES:  [address] & Units 2021   WBC 4.6 - 13.2 K/uL 9.4   ANC 1.8 - 8.0 K/UL 5.5   HGB 13.0 - 16.0 g/dL 16.1 (H)    - 420 K/uL 340   AST 10 - 38 U/L 38   ALT 16 - 61 U/L 115 (H)   Alk Phos 45 - 117 U/L 53   Bili, Total 0.2 - 1.0 MG/DL 0.5   Bili, Direct 0.0 - 0.2 MG/DL 0.2   Albumin 3.4 - 5.0 g/dL 4.0   BUN 7.0 - 18 MG/DL 14   Creat 0.6 - 1.3 MG/DL 0.79   Na 136 - 145 mmol/L 138   K 3.5 - 5.5 mmol/L 3.8   Cl 100 - 111 mmol/L 101   CO2 21 - 32 mmol/L 29   Glucose 74 - 99 mg/dL 101 (H)     SEROLOGIES:  2017.  HBsurface antigen negative, anti-HCV negative, Ferritin 567, CARMELITA negative,     Serologies Latest Ref Rng & Units 2017   Hep A Ab, Total NEGATIVE   Positive (A)   Hep B Core Ab, Total NEGATIVE   NEGATIVE   Hep B Surface Ab >10.0 mIU/mL <3.10 (L)   Hep B Surface Ab Interp POS   NEGATIVE (A)   Ferritin 8 - 388 NG/ (H)   Iron % Saturation % 37   ASMCA 0 - 19 Units 9   Ceruloplasmin 16.0 - 31.0 mg/dL 28.5   Alpha-1 antitrypsin level 90 - 200 mg/dL 153     LIVER HISTOLOGY:  9/2017. Slides reviewed by MLS. GARCIA. 66-75% macro and micovesicular steatosis. Mild ballooning. Mild inflammation. Stage 2 septal fibrosis. DARRION (311).    6/2022. FibroScan performed at [address]. EkPa was 6.0. IQR/med 28%. . The results suggested a fibrosis level of F1. The CAP score suggests there is hepatic steatosis. ENDOSCOPIC PROCEDURES:  Not available or performed    RADIOLOGY:  2/2021. MRI of the liver. Changes consistent with fatty liver. No liver mass lesions. OTHER TESTING:  Not available or performed    ASSESSMENT AND PLAN:  GARCIA with stage 2 septal fibrosis. Will perform laboratory testing to monitor liver function and degree of liver injury. This included   BMP, hepatic panel, CBC with platelet count. Liver transaminases are elevated. Alkaline phosphate is normal. Liver function is normal. The platelet count is normal.      Serologic and virologic studies to assess for other causes of chronic liver disease are negative. The patient was counseled regarding diet and exercise to achieve weight loss. The best diet for patients with fatty liver is one very low in carbohydrates and enriched with protein such as an Michelle's program.      The patient was told not to consume any food products and drinks containing fructose as this enhances hepatic fat synthesis. There is no medication of vitamin supplements that we advocate for GARCIA. Using glitazones in patients without diabetes mellitus has been shown to reduce fat content in the liver but has no effect on fibrosis and is associated with weight gain. Vitamin E has also been used but the data is not very good and most experts no longer advocate this. The only medical treatments for GARCIA are through clinical trials. The patient was offered participation in a clinical trial for treatment of GARCIA.   The patient would like to particiapte in a clinical trial.    We will continue to monitor the patient at periodic intervals. If weight loss is successful the fat will resolve from the liver and liver enzymes should return to the normal range. We would then repeat an ultrasound to see if this also returns to normal.  If liver enzymes do not return to normal with weight reduction additional evaluation may be necessary over time. The patient was directed to continue all current medications at the current dosages. There are no contraindications for the patient to take any medications that are necessary for treatment of other medical issues including medications for diabetes mellitus and hypercholesterolemia. The patient was counseled regarding alcohol consumption and that this could contribute to fatty liver disease. Vaccination for viral hepatitis B is recommended since the patient has no serologic evidence of previous exposure or vaccination with immunity. Vaccination for viral hepatitis A is not needed. The patient has serologic evidence of prior exposure or vaccination with immunity. Routine vaccinations against other bacterial and viral agents can be performed as indicated. Annual flu vaccination should be administered if indicated. FOLLOW-UP:  All of the issues listed above in the Assessment and Plan were discussed with the patient. All questions were answered. The patient expressed a clear understanding of the above. [address] [address] in 6 months to assess for the effects of diet changes and weight loss.        TESS Mulligan-BC  [address] Liver [place] of [address], [facility] [address]  [address] Mayra Sandoval Megan, [address]  [phone]

## 2022-08-22 ENCOUNTER — OFFICE VISIT (OUTPATIENT)
Dept: HEMATOLOGY | Age: 47
End: 2022-08-22

## 2022-08-22 ENCOUNTER — HOSPITAL ENCOUNTER (OUTPATIENT)
Dept: LAB | Age: 47
Discharge: HOME | End: 2022-08-22

## 2022-08-22 DIAGNOSIS — Z00.6 EXAMINATION OF PARTICIPANT IN CLINICAL TRIAL: Primary | ICD-10-CM

## 2022-08-22 PROCEDURE — 99000 SPECIMEN HANDLING OFFICE-LAB: CPT

## 2022-08-22 PROCEDURE — 99214 OFFICE O/P EST MOD 30 MIN: CPT | Performed by: NURSE PRACTITIONER

## 2022-08-22 NOTE — PROGRESS NOTES
3340 [facility], MD, FACP, Lenka Raul [place], [place]      [place] Mcgregor ACNP-BC     TESS Reich-BC   SABRINA Wang [facility]       Berto Ashley [facility] Feliciano 136    at [facility]    217 [facility], [address] [place] [address], Janicei  22.    [phone]    FAX: [address], [address], [address]    [phone]    FAX: 124 [facility] NOTE    Krista Ruff is a 41 y.o. male with GARCIA. The patient was seen today for screening to enroll into the [employer] clinical trial for patients with GARCIA. The consent form was reviewed with the patient. Potential side effects which may occur in the clinical trial were discussed. The potential benefits of the investigational product were discussed. The patient is aware this is a randomized placebo-controlled clinical trial and he may be receiving placebo. All questions raised by the patient were answered. The patient has decided to enter the clinical study and the consent for was signed. The consent form was signed before any physical examination, laboratory or other testing was performed. The medical history was reviewed. Today's visit was conducted per the study protocol. The patient was asked if any symptoms, side effects or adverse events have occurred since the last study visit. A physical examination was performed. All symptoms reported by the patient and the findings of the physical examination were recorded in the clinical trial documents.     Symptoms of clinical significance were:  None    Findings on physical examination of clinical significance were: None    The patient will return for follow-up per study protocol.       SABRINA Grady  Liver [place] of [facility]  [address], [address] [place] [address] Mayra Ibarra, 3100 [facility]   [phone]

## 2022-09-08 ENCOUNTER — HOSPITAL ENCOUNTER (OUTPATIENT)
Dept: LAB | Age: 47
Discharge: HOME | End: 2022-09-08

## 2022-09-08 PROCEDURE — 99000 SPECIMEN HANDLING OFFICE-LAB: CPT

## 2022-09-22 ENCOUNTER — OFFICE VISIT (OUTPATIENT)
Dept: HEMATOLOGY | Age: 47
End: 2022-09-22

## 2022-09-22 ENCOUNTER — HOSPITAL ENCOUNTER (OUTPATIENT)
Dept: LAB | Age: 47
Discharge: HOME | End: 2022-09-22

## 2022-09-22 DIAGNOSIS — Z00.6 EXAMINATION OF PARTICIPANT IN CLINICAL TRIAL: Primary | ICD-10-CM

## 2022-09-22 PROCEDURE — 99214 OFFICE O/P EST MOD 30 MIN: CPT | Performed by: NURSE PRACTITIONER

## 2022-09-22 PROCEDURE — 99000 SPECIMEN HANDLING OFFICE-LAB: CPT

## 2022-09-22 NOTE — PROGRESS NOTES
3340 [facility], MD, FACP, Lenka Raul [place], [place]      [place] Luna [facility]-BC     Leeanna Buck [facility]   SABRINA Arrington [facility]    217 [facility], [address] [facility], [address]    [phone]    FAX: 126 [facility]    [address], 1700 [facility], [address]    [phone]    FAX: 124 [facility] NOTE    Margaret Danielson is a 41 y.o. male with GARCIA. The patient was seen today for screening to enroll into the [employer] clinical trial for patients with GARCIA. The consent form was reviewed with the patient. Potential side effects which may occur in the clinical trial were discussed. The potential benefits of the investigational product were discussed. The patient is aware this is a randomized placebo-controlled clinical trial and s/he may be receiving placebo. All questions raised by the patient were answered. The patient has decided to enter the clinical study and the consent for was signed. The consent form was signed before any physical examination, laboratory or other testing was performed. The medical history was reviewed. A Fibroscan was performed. A physical examination was performed. All symptoms reported by the patient and the findings of the physical examination were recorded in the clinical trial documents. Symptoms of clinical significance were:  None    Findings on physical examination of clinical significance were: None    The patient will return for follow-up per study protocol.         REBECCA GonzalezNP-[facility]. Mariya Mustafa 144 Liver [facility]  [address] [place] [address],[address] Elvis Grayson 89 [address] [facility] [address], [address]  [phone]

## 2022-10-12 ENCOUNTER — HOSPITAL ENCOUNTER (OUTPATIENT)
Dept: ULTRASOUND IMAGING | Age: 47
Discharge: HOME | End: 2022-10-12
Attending: INTERNAL MEDICINE

## 2022-10-12 ENCOUNTER — HOSPITAL ENCOUNTER (OUTPATIENT)
Age: 47
Setting detail: OUTPATIENT SURGERY
Discharge: HOME | End: 2022-10-12
Attending: INTERNAL MEDICINE | Admitting: INTERNAL MEDICINE

## 2022-10-12 VITALS
TEMPERATURE: 97.9 F | DIASTOLIC BLOOD PRESSURE: 71 MMHG | RESPIRATION RATE: 16 BRPM | OXYGEN SATURATION: 96 % | HEIGHT: 70 IN | SYSTOLIC BLOOD PRESSURE: 122 MMHG | HEART RATE: 79 BPM | WEIGHT: 283.6 LBS | BODY MASS INDEX: 40.6 KG/M2

## 2022-10-12 DIAGNOSIS — K76.0 NAFLD (NONALCOHOLIC FATTY LIVER DISEASE): Primary | ICD-10-CM

## 2022-10-12 DIAGNOSIS — R79.89 ELEVATED LFTS: ICD-10-CM

## 2022-10-12 LAB — GLUCOSE BLD STRIP.AUTO-MCNC: 136 MG/DL (ref 70–110)

## 2022-10-12 PROCEDURE — 76040000019: Performed by: INTERNAL MEDICINE

## 2022-10-12 PROCEDURE — 2709999900 HC NON-CHARGEABLE SUPPLY: Performed by: INTERNAL MEDICINE

## 2022-10-12 PROCEDURE — 77030013826 HC NDL BIOP MAXCOR BARD -B: Performed by: INTERNAL MEDICINE

## 2022-10-12 PROCEDURE — 77030003666 HC NDL SPINAL BD -A: Performed by: INTERNAL MEDICINE

## 2022-10-12 PROCEDURE — 74011000250 HC RX REV CODE- 250: Performed by: INTERNAL MEDICINE

## 2022-10-12 PROCEDURE — 47000 NEEDLE BIOPSY OF LIVER PERQ: CPT | Performed by: INTERNAL MEDICINE

## 2022-10-12 PROCEDURE — 74011250636 HC RX REV CODE- 250/636: Performed by: INTERNAL MEDICINE

## 2022-10-12 PROCEDURE — 88307 TISSUE EXAM BY PATHOLOGIST: CPT

## 2022-10-12 PROCEDURE — 76942 ECHO GUIDE FOR BIOPSY: CPT | Performed by: INTERNAL MEDICINE

## 2022-10-12 PROCEDURE — 76705 ECHO EXAM OF ABDOMEN: CPT

## 2022-10-12 PROCEDURE — 88313 SPECIAL STAINS GROUP 2: CPT

## 2022-10-12 PROCEDURE — 82962 GLUCOSE BLOOD TEST: CPT

## 2022-10-12 RX ORDER — SODIUM CHLORIDE 9 MG/ML
125 INJECTION, SOLUTION INTRAVENOUS CONTINUOUS
Status: DISCONTINUED | OUTPATIENT
Start: 2022-10-12 | End: 2022-10-12 | Stop reason: HOSPADM

## 2022-10-12 RX ORDER — SODIUM CHLORIDE 0.9 % (FLUSH) 0.9 %
5-40 SYRINGE (ML) INJECTION AS NEEDED
Status: CANCELLED | OUTPATIENT
Start: 2022-10-12

## 2022-10-12 RX ORDER — LIDOCAINE HYDROCHLORIDE 10 MG/ML
10 INJECTION INFILTRATION; PERINEURAL ONCE
Status: COMPLETED | OUTPATIENT
Start: 2022-10-12 | End: 2022-10-12

## 2022-10-12 RX ORDER — FENTANYL CITRATE 50 UG/ML
25 INJECTION, SOLUTION INTRAMUSCULAR; INTRAVENOUS
Status: DISCONTINUED | OUTPATIENT
Start: 2022-10-12 | End: 2022-10-12 | Stop reason: HOSPADM

## 2022-10-12 RX ORDER — SODIUM CHLORIDE 0.9 % (FLUSH) 0.9 %
5-40 SYRINGE (ML) INJECTION EVERY 8 HOURS
Status: CANCELLED | OUTPATIENT
Start: 2022-10-12

## 2022-10-12 RX ORDER — ONDANSETRON 2 MG/ML
4 INJECTION INTRAMUSCULAR; INTRAVENOUS
Status: DISCONTINUED | OUTPATIENT
Start: 2022-10-12 | End: 2022-10-12 | Stop reason: HOSPADM

## 2022-10-12 RX ADMIN — SODIUM CHLORIDE 125 ML/HR: 9 INJECTION, SOLUTION INTRAVENOUS at 08:10

## 2022-10-12 NOTE — H&P
[facility], MD, FACP, Lenka Raul [place], [place]      Katy Dickens, [place] Buck, [facility]-BC   Adam Kerr, [facility]   Carlos Enrique Waite, SABRINA Batista Poster, FNP-C [place], [facility]       Berto Ashley Greg De Fel[address] 136    at [address], [facility] Abran Santillan  22.    [phone]    FAX: [address] [place] [address], [address]    [phone]    FAX: [phone]         PRE-PROCEDURE NOTE - LIVER BIOPSY    H and P from last office visit reviewed. Allergies reviewed. Out-patient medication list reviewed. Patient Active Problem List   Diagnosis Code    GARCIA (nonalcoholic steatohepatitis) K75.81    Type II diabetes mellitus (HCC) E11.9    Hypercholesterolemia E78.00    SHIRA on CPAP G47.33, Z99.89    Hypertension I10       No Known Allergies    No current facility-administered medications on file prior to encounter. Current Outpatient Medications on File Prior to Encounter   Medication Sig Dispense Refill    semaglutide (RYBELSUS) 14 mg tablet       empagliflozin (JARDIANCE) 25 mg tablet Take  by mouth daily. metFORMIN (GLUCOPHAGE) 500 mg tablet Take 1,000 mg by mouth two (2) times daily (with meals). atorvastatin (LIPITOR) 10 mg tablet Take 10 mg by mouth daily. multivitamin (ONE A DAY) tablet Take 1 Tab by mouth daily. lisinopril-hydroCHLOROthiazide (PRINZIDE, ZESTORETIC) 20-25 mg per tablet Take  by mouth daily. loratadine (CLARITIN) 10 mg tablet Take 10 mg by mouth daily. For liver biopsy to assess NALFD. The risks of the procedure were discussed with the patient. This included bleeding, pain, and puncture of other organs. All questions were answered.   The patient wishes to proceed with the procedure. The patient was counseled at length about the risks of trixie Covid-19 in the trey-operative and post-operative states including the recovery window of their procedure. The patient was made aware that trixie Covid-19 after a surgical procedure may worsen their prognosis for recovering from the virus and lend to a higher morbidity and or mortality risk. The patient was given the options of postponing their procedure. All of the risks, benefits, and alternatives were discussed. The patient does  wish to proceed with the procedure. PHYSICAL EXAMINATION:  Visit Vitals  /78   Pulse 77   Temp 98 °F (36.7 °C)   Resp 16   Ht 5' 10\" (1.778 m)   Wt 283 lb 9.6 oz (128.6 kg)   SpO2 97%   BMI 40.69 kg/m²       General: No acute distress. Eyes: Sclera anicteric. ENT: No oral lesions. Thyroid normal.  Nodes: No adenopathy. Skin: No spider angiomata. No jaundice. No palmar erythema. Respiratory: Lungs clear to auscultation. Cardiovascular: Regular heart rate. No murmurs. No JVD. Abdomen: Soft non-tender, liver size normal to percussion/palpation. Spleen not palpable. No obvious ascites. Extremities: No edema. No muscle wasting. No gross arthritic changes. Neurologic: Alert and oriented. Cranial nerves grossly intact. No asterixis. LABS:  Lab Results   Component Value Date/Time    WBC 7.9 06/09/2022 08:28 AM    HGB 15.9 06/09/2022 08:28 AM    HCT 49.7 (H) 06/09/2022 08:28 AM    PLATELET 531 12/86/3872 08:28 AM    MCV 89.2 06/09/2022 08:28 AM     Lab Results   Component Value Date/Time    INR 1.0 06/04/2018 07:38 AM    INR 1.0 08/02/2017 03:41 PM    Prothrombin time 12.3 06/04/2018 07:38 AM    Prothrombin time 12.8 08/02/2017 03:41 PM       ASSESSMENT AND PLAN:  Liver biopsy under ultrasound guidance.     MD Karen [address] of [address], [address] [place] [address] Street [address] [phone]  FAX: [address] [place] [address]

## 2022-10-12 NOTE — DISCHARGE INSTRUCTIONS
[facility], MD, FACP, [place] Ra[place], [place]      Anthony Amin, [place]    April S Cielo, AGPCNP-BC   Lilly Miky, ACNPC-AG   Verataylor Patterson, FNP-C   Stalin Cardona, FNP-C    Kishan Oliver, AGPCNP-BC       Berto Osborneaktiuska [facility] 136    at [place] [address] [address] [facility], [address] Abr[address]  22.    [phone]    FAX: [address] [place] [address]    [address], [address] [place] [address], [address]    [phone]    FAX: [phone]         LIVER BIOPSY DISCHARGE INSTRUCTIONS      Marco Muller  1975  Date: 10/12/2022    DIET:    Desiree Monaco may resume your previous diet. ACTIVITIES:  Rest quietly the rest of today. You should not lift any objects more than 20 pounds for the next 2 days. If you work sitting down without strenuous activity you may return to work tomorrow. If you exert yourself or do heavy lifting at work you should take tomorrow off. Do not drive or operate hazardous machinery for 12 hours after you are discharged from this procedure. SPECIAL INSTRUCTIONS:  Do not use any aspirin or non-steroidal (Motin, Advil, Naproxen, etc) pain medications for the next 2 days. You may use extra-strength Tylenol (acetaminophen) if you experience pain or discomfort later today. Restarting blood thinners: If you were taking blood thinners prior to the procedure you can restart these in 2 days. Call the [address] [place] [address] office if you experience any of the following:  Persistent or severe abdominal pain. Persistent or severe abdominal distention. Fever and chills   Nausea and vomiting. New or unusual symptoms. Follow-up care: You should have a follow up appointment with Dr. Abe Villanueva to review the results of the liver biopsy results in 2 weeks.   If you do not have an appointment please call the office at the number listed above to schedule this. Other instructions: If you have any problems or questions call the [facility] office at the phone number listed above. DISCHARGE SUMMARY from Nurse: The following personal items collected during your admission are returned to you:   Dental Appliance: Dental Appliances: None  Vision: Visual Aid: Glasses, At bedside, With patient  Hearing Aid:    Jewelry:    Clothing:    Other Valuables:    Valuables sent to safe:            Learning About Coronavirus (COVID-19)  Coronavirus (COVID-19): Overview  What is coronavirus ([id number]-04)? The coronavirus disease (COVID-19) is caused by a virus. It is an illness that was first found in [place], [place], in December 2019. It has since spread worldwide. The virus can cause fever, cough, and trouble breathing. In severe cases, it can cause pneumonia and make it hard to breathe without help. It can cause death. Coronaviruses are a large group of viruses. They cause the common cold. They also cause more serious illnesses like Middle East respiratory syndrome (MERS) and severe acute respiratory syndrome (SARS). COVID-19 is caused by a novel coronavirus. That means it's a new type that has not been seen in people before. This virus spreads person-to-person through droplets from coughing and sneezing. It can also spread when you are close to someone who is infected. And it can spread when you touch something that has the virus on it, such as a doorknob or a tabletop. What can you do to protect yourself from coronavirus (COVID-19)? The best way to protect yourself from getting sick is to: Avoid areas where there is an outbreak. Avoid contact with people who may be infected. Wash your hands often with soap or alcohol-based hand sanitizers. Avoid crowds and try to stay at least 6 feet away from other people. Wash your hands often, especially after you cough or sneeze.  Use soap and water, and scrub for at least 20 seconds. If soap and water aren't available, use an alcohol-based hand . Avoid touching your mouth, nose, and eyes. What can you do to avoid spreading the virus to others? To help avoid spreading the virus to others:  Cover your mouth with a tissue when you cough or sneeze. Then throw the tissue in the trash. Use a disinfectant to clean things that you touch often. Stay home if you are sick or have been exposed to the virus. Don't go to school, work, or public areas. And don't use public transportation. If you are sick:  Leave your home only if you need to get medical care. But call the doctor's office first so they know you're coming. And wear a face mask, if you have one. If you have a face mask, wear it whenever you're around other people. It can help stop the spread of the virus when you cough or sneeze. Clean and disinfect your home every day. Use household  and disinfectant wipes or sprays. Take special care to clean things that you grab with your hands. These include doorknobs, remote controls, phones, and handles on your refrigerator and microwave. And don't forget countertops, tabletops, bathrooms, and computer keyboards. When to call for help  Call 911 anytime you think you may need emergency care. For example, call if:  You have severe trouble breathing. (You can't talk at all.)  You have constant chest pain or pressure. You are severely dizzy or lightheaded. You are confused or can't think clearly. Your face and lips have a blue color. You pass out (lose consciousness) or are very hard to wake up. Call your doctor now if you develop symptoms such as:  Shortness of breath. Fever. Cough. If you need to get care, call ahead to the doctor's office for instructions before you go. Make sure you wear a face mask, if you have one, to prevent exposing other people to the virus. Where can you get the latest information?   The following health organizations are tracking and studying this virus. Their websites contain the most up-to-date information. Junie Hart also learn what to do if you think you may have been exposed to the virus. U.S. Centers for Disease Control and Prevention (CDC): The CDC provides updated news about the disease and travel advice. The website also tells you how to prevent the spread of infection. www.cdc.gov  World Health Organization [facility]): WHO offers information about the virus outbreaks. WHO also has travel advice. www.who.int  Current as of: April 1, 2020               Content Version: 12.4  © 1552-5301 Healthwise, Incorporated. Care instructions adapted under license by your healthcare professional. If you have questions about a medical condition or this instruction, always ask your healthcare professional. [address] any warranty or liability for your use of this information. DISCHARGE SUMMARY from Nurse    The following personal items collected during your admission are returned to you:   Dental Appliance: Dental Appliances: None  Vision: Visual Aid: Glasses, At bedside, With patient  Hearing Aid:    Jewelry:    Clothing:    Other Valuables:    Valuables sent to safe:              PATIENT INSTRUCTIONS:    After general anesthesia or intravenous sedation, for 24 hours or while taking prescription Narcotics:  Limit your activities  Do not drive and operate hazardous machinery  Do not make important personal or business decisions  Do  not drink alcoholic beverages  If you have not urinated within 8 hours after discharge, please contact your surgeon on call.     Report the following to your surgeon:  Excessive pain, swelling, redness or odor of or around the surgical area  Temperature over 100.5  Nausea and vomiting lasting longer than 4 hours or if unable to take medications  Any signs of decreased circulation or nerve impairment to extremity: change in color, persistent  numbness, tingling, coldness or increase pain  Any questions      [unfilled]    What to do at Home:  Recommended activity: as above,     If you experience any of the following symptoms as above, please follow up with Dr. Lorenzo Gutierrez. *  Please give a list of your current medications to your Primary Care Provider. *  Please update this list whenever your medications are discontinued, doses are      changed, or new medications (including over-the-counter products) are added. *  Please carry medication information at all times in case of emergency situations. These are general instructions for a healthy lifestyle:    No smoking/ No tobacco products/ Avoid exposure to second hand smoke    Surgeon General's Warning:  Quitting smoking now greatly reduces serious risk to your health. Obesity, smoking, and sedentary lifestyle greatly increases your risk for illness    A healthy diet, regular physical exercise & weight monitoring are important for maintaining a healthy lifestyle    You may be retaining fluid if you have a history of heart failure or if you experience any of the following symptoms:  Weight gain of 3 pounds or more overnight or 5 pounds in a week, increased swelling in our hands or feet or shortness of breath while lying flat in bed. Please call your doctor as soon as you notice any of these symptoms; do not wait until your next office visit. Recognize signs and symptoms of STROKE:    F-face looks uneven    A-arms unable to move or move unevenly    S-speech slurred or non-existent    T-time-call 911 as soon as signs and symptoms begin-DO NOT go       Back to bed or wait to see if you get better-TIME IS BRAIN. The discharge information has been reviewed with the patient and spouse. The patient and spouse verbalized understanding. Warning Signs of HEART ATTACK     Call 911 if you have these symptoms:  Chest discomfort.  Most heart attacks involve discomfort in the center of the chest that lasts more than a few minutes, or that goes away and comes back. It can feel like uncomfortable pressure, squeezing, fullness, or pain. Discomfort in other areas of the upper body. Symptoms can include pain or discomfort in one or both arms, the back, neck, jaw, or stomach. Shortness of breath with or without chest discomfort. Other signs may include breaking out in a cold sweat, nausea, or lightheadedness. Don't wait more than five minutes to call 911 - MINUTES MATTER! Fast action can save your life. Calling 911 is almost always the fastest way to get lifesaving treatment. Emergency Medical Services staff can begin treatment when they arrive -- up to an hour sooner than if someone gets to the hospital by car. The discharge information has been reviewed with the patient and caregiver. The patient and caregiver verbalized understanding. Discharge medications reviewed with the patient and guardian and appropriate educational materials and side effects teaching were provided.     Patient armband removed and shredded

## 2022-10-12 NOTE — PROCEDURES
[facility], MD, FACP, Lenka [place], Cassie Rogres [facility] [place], [facility]   Adrianne Cotter, [facility]   Fernanda Burks, FNNADINE-JAYSON Juarez P-JAYSON Miramontes, [facility]       Berto Ashley [facility] 136    at [address], [facility] Abran Santillan  22.    [phone]    FAX: [address] [place] [address], [address] [place] [address] - [address]    [phone]    FAX: [phone]         LIVER BIOPSY PROCEDURE NOTE    Ethan Halley  1975    INDICATIONS/PRE-OPERATIVE  DIAGNOSIS:  NAFLD    : Anmol Tapia MD    SURGICAL ASSISTANT:  None    PROSTHETIC DEVICES, TISSUE GRAFTS, TRANSPLANTED ORGANS:  Not applicable    SEDATION: 1% Lidocaine injection 15 ml    PROCEDURE:  Informed consent to perform the procedure was obtained from the patient. The patient was positioned on the edge of the stretcher lying flat in the supine position. Ultrasound was utilized to image the liver. The diaphragm and any major mass lesion or vascular structures within the liver were identified. An appropriate site for liver biopsy was identified. The distance from the surface of the skin to the liver capsule was 5 cm. This area was prepped with betadine and draped in sterile fashion. The skin was infiltrated with 1% lidocaine. The deeper subcutanous tissues and liver capsule overlying the biopsy site were then infiltrated with 1% lidocaine until appropriate anesthesia was obtained. A small incision was made in the skin so the biopsy devise could be easily inserted. A total of 2 passes with the 16 gauge Bard biopsy devise was then made into the liver. Core(s) of liver tissue totaling 4 cm in length were obtained and placed into tissue fixative.   A band aid was placed over the biopsy site. The patient was then repositioned on the right side and transported to the recovery area on the stretcher for routine monitoring until discharge. The specimen was sent to pathology for processing via the normal transport mechanism. SPECIMEN COLLECTED: Liver    INTERVENTIONS:  None    ESTIMATED BLOOD LOSS: Negligible.      COMPLICATIONS:  None    POST-OPERATIVE DIAGNOSIS: Same as Pre-operative Diagnosis      Ninfa Mccracken MD  [address], [address] [place] [address] Street [address] [phone]  FAX: [address] [place] [address]

## 2022-12-14 ENCOUNTER — HOSPITAL ENCOUNTER (OUTPATIENT)
Dept: LAB | Age: 47
Discharge: HOME | End: 2022-12-14
Payer: OTHER GOVERNMENT

## 2022-12-14 ENCOUNTER — OFFICE VISIT (OUTPATIENT)
Dept: HEMATOLOGY | Age: 47
End: 2022-12-14
Payer: OTHER GOVERNMENT

## 2022-12-14 VITALS
OXYGEN SATURATION: 96 % | TEMPERATURE: 96.4 F | WEIGHT: 283.25 LBS | SYSTOLIC BLOOD PRESSURE: 107 MMHG | BODY MASS INDEX: 40.64 KG/M2 | HEART RATE: 83 BPM | DIASTOLIC BLOOD PRESSURE: 63 MMHG

## 2022-12-14 DIAGNOSIS — K75.81 NASH (NONALCOHOLIC STEATOHEPATITIS): Primary | ICD-10-CM

## 2022-12-14 DIAGNOSIS — K75.81 NASH (NONALCOHOLIC STEATOHEPATITIS): ICD-10-CM

## 2022-12-14 LAB
ALBUMIN SERPL-MCNC: 4 G/DL (ref 3.4–5)
ALBUMIN/GLOB SERPL: 1.4 {RATIO} (ref 0.8–1.7)
ALP SERPL-CCNC: 52 U/L (ref 45–117)
ALT SERPL-CCNC: 102 U/L (ref 16–61)
ANION GAP SERPL CALC-SCNC: 6 MMOL/L (ref 3–18)
AST SERPL-CCNC: 36 U/L (ref 10–38)
BASOPHILS # BLD: 0.1 K/UL (ref 0–0.1)
BASOPHILS NFR BLD: 1 % (ref 0–2)
BILIRUB DIRECT SERPL-MCNC: 0.2 MG/DL (ref 0–0.2)
BILIRUB SERPL-MCNC: 0.8 MG/DL (ref 0.2–1)
BUN SERPL-MCNC: 17 MG/DL (ref 7–18)
BUN/CREAT SERPL: 24 (ref 12–20)
CALCIUM SERPL-MCNC: 9.1 MG/DL (ref 8.5–10.1)
CHLORIDE SERPL-SCNC: 102 MMOL/L (ref 100–111)
CO2 SERPL-SCNC: 30 MMOL/L (ref 21–32)
CREAT SERPL-MCNC: 0.72 MG/DL (ref 0.6–1.3)
DIFFERENTIAL METHOD BLD: ABNORMAL
EOSINOPHIL # BLD: 0.3 K/UL (ref 0–0.4)
EOSINOPHIL NFR BLD: 4 % (ref 0–5)
ERYTHROCYTE [DISTWIDTH] IN BLOOD BY AUTOMATED COUNT: 14.7 % (ref 11.6–14.5)
GLOBULIN SER CALC-MCNC: 2.8 G/DL (ref 2–4)
GLUCOSE SERPL-MCNC: 125 MG/DL (ref 74–99)
HCT VFR BLD AUTO: 48.5 % (ref 36–48)
HGB BLD-MCNC: 16 G/DL (ref 13–16)
IMM GRANULOCYTES # BLD AUTO: 0 K/UL (ref 0–0.04)
IMM GRANULOCYTES NFR BLD AUTO: 0 % (ref 0–0.5)
LYMPHOCYTES # BLD: 1.8 K/UL (ref 0.9–3.6)
LYMPHOCYTES NFR BLD: 24 % (ref 21–52)
MCH RBC QN AUTO: 28.3 PG (ref 24–34)
MCHC RBC AUTO-ENTMCNC: 33 G/DL (ref 31–37)
MCV RBC AUTO: 85.8 FL (ref 78–100)
MONOCYTES # BLD: 0.5 K/UL (ref 0.05–1.2)
MONOCYTES NFR BLD: 7 % (ref 3–10)
NEUTS SEG # BLD: 4.8 K/UL (ref 1.8–8)
NEUTS SEG NFR BLD: 64 % (ref 40–73)
NRBC # BLD: 0 K/UL (ref 0–0.01)
NRBC BLD-RTO: 0 PER 100 WBC
PLATELET # BLD AUTO: 314 K/UL (ref 135–420)
PMV BLD AUTO: 9.8 FL (ref 9.2–11.8)
POTASSIUM SERPL-SCNC: 3.5 MMOL/L (ref 3.5–5.5)
PROT SERPL-MCNC: 6.8 G/DL (ref 6.4–8.2)
RBC # BLD AUTO: 5.65 M/UL (ref 4.35–5.65)
SODIUM SERPL-SCNC: 138 MMOL/L (ref 136–145)
WBC # BLD AUTO: 7.4 K/UL (ref 4.6–13.2)

## 2022-12-14 PROCEDURE — 80048 BASIC METABOLIC PNL TOTAL CA: CPT

## 2022-12-14 PROCEDURE — 85025 COMPLETE CBC W/AUTO DIFF WBC: CPT

## 2022-12-14 PROCEDURE — 36415 COLL VENOUS BLD VENIPUNCTURE: CPT

## 2022-12-14 PROCEDURE — 3074F SYST BP LT 130 MM HG: CPT | Performed by: NURSE PRACTITIONER

## 2022-12-14 PROCEDURE — 3078F DIAST BP <80 MM HG: CPT | Performed by: NURSE PRACTITIONER

## 2022-12-14 PROCEDURE — 99213 OFFICE O/P EST LOW 20 MIN: CPT | Performed by: NURSE PRACTITIONER

## 2022-12-14 PROCEDURE — 80076 HEPATIC FUNCTION PANEL: CPT

## 2022-12-14 NOTE — PROGRESS NOTES
[address] [facility]", MD, [address], [place], [place]      [place] Guzman, [facility]-BC   Demetrice Proctor, [facility]   Jennifer Escalante, FNPCHRISTIN Young, FNP-C Jaynee Castleman, [facility]      [address]   at [address], [address]  22.   [phone]   FAX: [address]   at [address], [address]   [phone]   FAX: [phone]       Patient Care Team:  Billy Fairchild NP as PCP - General (Nurse Practitioner)    Problem List  Date Reviewed: 9/22/2022            Codes Class Noted    GARCIA (nonalcoholic steatohepatitis) ICD-10-CM: K75.81  ICD-9-CM: 571.8  8/2/2017        Type II diabetes mellitus ([facility] ICD-10-CM: E11.9  ICD-9-CM: 250.00  8/2/2017        Hypercholesterolemia ICD-10-CM: E78.00  ICD-9-CM: 272.0  8/2/2017        SHIRA on CPAP ICD-10-CM: G47.33, Z99.89  ICD-9-CM: 327.23, V46.8  8/2/2017        Hypertension ICD-10-CM: I10  ICD-9-CM: 401.9  8/2/2017           Stephan Bradford [facility] management of non-alcoholic steatohepatitis (GARCIA). The active problem list, all pertinent past medical history, medications, liver histology, radiologic findings and laboratory findings related to the liver disorder were reviewed with the patient. The patient is a 41 y.o. male that was diagnosed with GARCIA with pericellular fibrosis in 9/2017. He has been participating in clinical trials for the treatment of GARCIA since 2017. He recently screen failed for 2 GARCIA trials available. The most recent imaging of the liver was MRI performed in 2/2021. Results suggest fatty liver disease. No liver mass lesions noted. Assessment of liver fibrosis with Fibroscan was performed in 6/2022.  The result was 6.0 kPa which correlates with stage 1 portal fibrosis. The CAP score of 367 suggests hepatic steatosis. The patient has no symptoms which could be attributed to the liver disorder. The patient completes all daily activities without any functional limitations. The patient has not experienced fatigue, pain in the right side over the liver,     ASSESSMENT AND PLAN:  GARCIA with stage 2 septal fibrosis. Have performed laboratory testing to monitor liver function and degree of liver injury. This included   BMP, hepatic panel, CBC with platelet count. AST is normal. ALT is elevated. Alkaline phosphate is normal. Liver function is normal. The platelet count is normal.      Serologic and virologic studies to assess for other causes of chronic liver disease are negative. The patient was counseled regarding diet and exercise to achieve weight loss. The best diet for patients with fatty liver is one very low in carbohydrates and enriched with protein such as an Michelle's program.      The patient was told not to consume any food products and drinks containing fructose as this enhances hepatic fat synthesis. There is no medication of vitamin supplements that we advocate for GARCIA. Using glitazones in patients without diabetes mellitus has been shown to reduce fat content in the liver but has no effect on fibrosis and is associated with weight gain. Vitamin E has also been used but the data is not very good and most experts no longer advocate this. The only medical treatments for GARCIA are through clinical trials. The patient was offered participation in a clinical trial for treatment of GARCIA. The patient would like to particiapte in a clinical trial.    We will continue to monitor the patient at periodic intervals. If weight loss is successful the fat will resolve from the liver and liver enzymes should return to the normal range.   We would then repeat an ultrasound to see if this also returns to normal.  If liver enzymes do not return to normal with weight reduction additional evaluation may be necessary over time. Screening for Hepatocellular Carcinoma  HCC screening is not necessary if the patient has no evidence of cirrhosis. Treatment of other medical problems in patients with chronic liver disease  There are no contraindications for the patient to take any medications that are necessary for treatment of other medical issues. Normal doses of acetaminophen can be used for pain as needed. Normal doses of acetaminophen as recommended on the label are not hepatotoxic, even in patients with cirrhosis. The patient does not have cirrhosis. Normal doses of NSAIDs can be used for pain as needed. Counseling for alcohol in patients with chronic liver disease  The patient does not consume any significant amount of alcohol. The patient was reminded that alcohol can cause fatty liver. Vaccinations   Vaccination for viral hepatitis B is recommended since the patient has no serologic evidence of previous exposure or vaccination with immunity. Vaccination for viral hepatitis A is not needed. The patient has serologic evidence of prior exposure or vaccination with immunity. Routine vaccinations against other bacterial and viral agents can be performed as indicated. Annual flu vaccination should be administered if indicated. ALLERGIES  No Known Allergies    MEDICATIONS  Current Outpatient Medications   Medication Sig    semaglutide (RYBELSUS) 14 mg tablet     empagliflozin (JARDIANCE) 25 mg tablet Take  by mouth daily. metFORMIN (GLUCOPHAGE) 500 mg tablet Take 1,000 mg by mouth two (2) times daily (with meals). atorvastatin (LIPITOR) 10 mg tablet Take 10 mg by mouth daily. multivitamin (ONE A DAY) tablet Take 1 Tab by mouth daily. lisinopril-hydroCHLOROthiazide (PRINZIDE, ZESTORETIC) 20-25 mg per tablet Take  by mouth daily.     loratadine (CLARITIN) 10 mg tablet Take 10 mg by mouth daily. No current facility-administered medications for this visit. SYSTEM REVIEW NOT RELATED TO LIVER DISEASE OR REVIEWED ABOVE:  Constitution systems: Negative for fever, chills, weight gain, weight loss. Eyes: Negative for visual changes. ENT: Negative for sore throat, painful swallowing. Respiratory: Negative for cough, hemoptysis, SOB. Cardiology: Negative for chest pain, palpitations. GI:  Negative for constipation or diarrhea. : Negative for urinary frequency, dysuria, hematuria, nocturia. Skin: Negative for rash. Hematology: Negative for easy bruising, blood clots. Musculo-skelatal: Negative for back pain, muscle pain, weakness. Neurologic: Negative for headaches, dizziness, vertigo, memory problems not related to HE. Psychology: Negative for anxiety, depression. FAMILY HISTORY:  The father has the following chronic diseases: DM. The mother has the following chronic diseases: DM. There is no family history of liver disease. SOCIAL HISTORY:  The patient is . The patient has 2 children, a son  in a MVA. The patient has never used tobacco products. The patient has never consumed significant amounts of alcohol. The patient currently works full time at [employer]. PHYSICAL EXAMINATION:  Visit Vitals  /63   Pulse 83   Temp (!) 96.4 °F (35.8 °C) (Tympanic)   Wt 283 lb 4 oz (128.5 kg)   SpO2 96%   BMI 40.64 kg/m²     General: No acute distress. Eyes: Sclera anicteric. ENT: No oral lesions. Thyroid normal.  Nodes: No adenopathy. Skin: No spider angiomata. No jaundice. No palmar erythema. Respiratory: Lungs clear to auscultation. Cardiovascular: Regular heart rate. No murmurs. No JVD. Abdomen: Soft non-tender. Liver size normal to percussion/palpation. Spleen not palpable. No obvious ascites. Extremities: No edema. No muscle wasting. No gross arthritic changes. Neurologic: Alert and oriented.   Cranial nerves grossly intact. No asterixis. LABORATORY STUDIES:  Liver [place] [address] Units 12/14/2022   WBC 4.6 - 13.2 K/uL 7.4   ANC 1.8 - 8.0 K/UL 4.8   HGB 13.0 - 16.0 g/dL 16.0    - 420 K/uL 314   AST 10 - 38 U/L 36   ALT 16 - 61 U/L 102 (H)   Alk Phos 45 - 117 U/L 52   Bili, Total 0.2 - 1.0 MG/DL 0.8   Bili, Direct 0.0 - 0.2 MG/DL 0.2   Albumin 3.4 - 5.0 g/dL 4.0   BUN 7.0 - 18 MG/DL 17   Creat 0.6 - 1.3 MG/DL 0.72   Na 136 - 145 mmol/L 138   K 3.5 - 5.5 mmol/L 3.5   Cl 100 - 111 mmol/L 102   CO2 21 - 32 mmol/L 30   Glucose 74 - 99 mg/dL 125 (H)     SEROLOGIES:  2/2017. HBsurface antigen negative, anti-HCV negative, Ferritin 567, CARMELITA negative,     Serologies Latest Ref Rng & Units 8/2/2017   Hep A Ab, Total NEGATIVE   Positive (A)   Hep B Core Ab, Total NEGATIVE   NEGATIVE   Hep B Surface Ab >10.0 mIU/mL <3.10 (L)   Hep B Surface Ab Interp POS   NEGATIVE (A)   Ferritin 8 - 388 NG/ (H)   Iron % Saturation % 37   ASMCA 0 - 19 Units 9   Ceruloplasmin 16.0 - 31.0 mg/dL 28.5   Alpha-1 antitrypsin level 90 - 200 mg/dL 153     LIVER HISTOLOGY:  9/2017. Slides reviewed by MLS. GARCIA. 66-75% macro and micovesicular steatosis. Mild ballooning. Mild inflammation. Stage 2 septal fibrosis. DARRION (311).    6/2022. FibroScan performed at [address]. EkPa was 6.0. IQR/med 28%. . The results suggested a fibrosis level of F1. The CAP score suggests there is hepatic steatosis. ENDOSCOPIC PROCEDURES:  Not available or performed    RADIOLOGY:  2/2021. MRI of the liver. Changes consistent with fatty liver. No liver mass lesions. OTHER TESTING:  Not available or performed      FOLLOW-UP:  All of the issues listed above in the Assessment and Plan were discussed with the patient. All questions were answered. The patient expressed a clear understanding of the above.     [facility] [place] [address] in 6 months to assess for the effects of diet changes and weight loss and for Fibroscan      TESS Sommers-LUI Phillips. Mairya Mustafa [address] [place] [address], [address]  [place], [address] [place] [address] Street [address]  [phone]

## 2023-02-09 NOTE — DISCHARGE SUMMARY
[address], [address], [facility], [place]       Veronda Portugal, NP Mellissa Boas, PA-C Carmella Cuna, [facility]-BC   ROSI Perkins NP        at [facility]     [address], [address] [facility]     [facility], [address]     [phone]     FAX: [phone]    at [address], [address] [facility], [address]     [phone]     FAX: [phone]       LIVER BIOPSY DISCHARGE INSTRUCTIONS      Panda Deelon  1975  Date: 9/20/2017    DIET:    Lavaun Doing may resume your previous diet. ACTIVITIES:  Rest quietly the rest of today. You should not lift any objects more than 20 pounds for the next 2 days. If you work sitting down without strenuous activity you may return to work tomorrow. If you exert yourself or do heavy lifting at work you should take tomorrow off. Do not drive or operate hazardous machinery for 12 hours. SPECIAL INSTRUCTIONS:  Do not use any aspirin or non-steroidal (Motin, Advil, Naproxen, etc) pain medications for the next 3 days. You may use extra-strength Tylenol (acetaminophen) if you experience pain or discomfort later today. Call the [address] office if you experience any of the following:  Persistent or severe abdominal pain. Persistent or severe abdominal distention. Fever and chills   Nausea and vomiting. New or unusual symptoms. Follow-up care: You should have a follow up appointment with Dr. Khloe Quintana to review the results of the liver biopsy results in 2 weeks. If you do not have an appointment please call the office at the number listed above to schedule this. Other instructions: If you have any problems or questions call the [facility] office at the phone number listed above. DISCHARGE SUMMARY from Nurse:     The following personal items collected during your admission are returned to you:   Dental Appliance: Dental Appliances: None  Vision: Visual Aid: Glasses  Hearing Aid:    Jewelry:    Clothing:    Other Valuables:    Valuables sent to safe: no

## 2023-06-20 ENCOUNTER — HOSPITAL ENCOUNTER (OUTPATIENT)
Facility: HOSPITAL | Age: 48
Setting detail: SPECIMEN
Discharge: HOME | End: 2023-06-23
Payer: OTHER GOVERNMENT

## 2023-06-20 ENCOUNTER — OFFICE VISIT (OUTPATIENT)
Age: 48
End: 2023-06-20
Payer: OTHER GOVERNMENT

## 2023-06-20 VITALS
BODY MASS INDEX: 40.52 KG/M2 | WEIGHT: 283 LBS | OXYGEN SATURATION: 96 % | HEIGHT: 70 IN | HEART RATE: 86 BPM | TEMPERATURE: 96.8 F

## 2023-06-20 DIAGNOSIS — K75.81 NASH (NONALCOHOLIC STEATOHEPATITIS): Primary | ICD-10-CM

## 2023-06-20 DIAGNOSIS — K75.81 NASH (NONALCOHOLIC STEATOHEPATITIS): ICD-10-CM

## 2023-06-20 LAB
ALBUMIN SERPL-MCNC: 4.1 G/DL (ref 3.4–5)
ALBUMIN/GLOB SERPL: 1.2 (ref 0.8–1.7)
ALP SERPL-CCNC: 68 U/L (ref 45–117)
ALT SERPL-CCNC: 117 U/L (ref 16–61)
ANION GAP SERPL CALC-SCNC: 5 MMOL/L (ref 3–18)
AST SERPL-CCNC: 38 U/L (ref 10–38)
BASOPHILS # BLD: 0.1 K/UL (ref 0–0.1)
BASOPHILS NFR BLD: 1 % (ref 0–2)
BILIRUB DIRECT SERPL-MCNC: 0.2 MG/DL (ref 0–0.2)
BILIRUB SERPL-MCNC: 0.5 MG/DL (ref 0.2–1)
BUN SERPL-MCNC: 16 MG/DL (ref 7–18)
BUN/CREAT SERPL: 21 (ref 12–20)
CALCIUM SERPL-MCNC: 9.6 MG/DL (ref 8.5–10.1)
CHLORIDE SERPL-SCNC: 105 MMOL/L (ref 100–111)
CO2 SERPL-SCNC: 31 MMOL/L (ref 21–32)
CREAT SERPL-MCNC: 0.78 MG/DL (ref 0.6–1.3)
DIFFERENTIAL METHOD BLD: ABNORMAL
EOSINOPHIL # BLD: 0.3 K/UL (ref 0–0.4)
EOSINOPHIL NFR BLD: 3 % (ref 0–5)
ERYTHROCYTE [DISTWIDTH] IN BLOOD BY AUTOMATED COUNT: 14.6 % (ref 11.6–14.5)
GLOBULIN SER CALC-MCNC: 3.3 G/DL (ref 2–4)
GLUCOSE SERPL-MCNC: 125 MG/DL (ref 74–99)
HCT VFR BLD AUTO: 50.3 % (ref 36–48)
HGB BLD-MCNC: 16.4 G/DL (ref 13–16)
IMM GRANULOCYTES # BLD AUTO: 0 K/UL (ref 0–0.04)
IMM GRANULOCYTES NFR BLD AUTO: 0 % (ref 0–0.5)
LYMPHOCYTES # BLD: 2.3 K/UL (ref 0.9–3.6)
LYMPHOCYTES NFR BLD: 25 % (ref 21–52)
MCH RBC QN AUTO: 28.3 PG (ref 24–34)
MCHC RBC AUTO-ENTMCNC: 32.6 G/DL (ref 31–37)
MCV RBC AUTO: 86.7 FL (ref 78–100)
MONOCYTES # BLD: 0.6 K/UL (ref 0.05–1.2)
MONOCYTES NFR BLD: 6 % (ref 3–10)
NEUTS SEG # BLD: 5.7 K/UL (ref 1.8–8)
NEUTS SEG NFR BLD: 64 % (ref 40–73)
NRBC # BLD: 0 K/UL (ref 0–0.01)
NRBC BLD-RTO: 0 PER 100 WBC
PLATELET # BLD AUTO: 297 K/UL (ref 135–420)
PMV BLD AUTO: 9.7 FL (ref 9.2–11.8)
POTASSIUM SERPL-SCNC: 3.8 MMOL/L (ref 3.5–5.5)
PROT SERPL-MCNC: 7.4 G/DL (ref 6.4–8.2)
RBC # BLD AUTO: 5.8 M/UL (ref 4.35–5.65)
SODIUM SERPL-SCNC: 141 MMOL/L (ref 136–145)
WBC # BLD AUTO: 8.9 K/UL (ref 4.6–13.2)

## 2023-06-20 PROCEDURE — 36415 COLL VENOUS BLD VENIPUNCTURE: CPT

## 2023-06-20 PROCEDURE — 80048 BASIC METABOLIC PNL TOTAL CA: CPT

## 2023-06-20 PROCEDURE — 80076 HEPATIC FUNCTION PANEL: CPT

## 2023-06-20 PROCEDURE — 99213 OFFICE O/P EST LOW 20 MIN: CPT | Performed by: NURSE PRACTITIONER

## 2023-06-20 PROCEDURE — 85025 COMPLETE CBC W/AUTO DIFF WBC: CPT

## 2023-06-20 PROCEDURE — 91200 LIVER ELASTOGRAPHY: CPT | Performed by: NURSE PRACTITIONER

## 2023-06-20 ASSESSMENT — PATIENT HEALTH QUESTIONNAIRE - PHQ9
SUM OF ALL RESPONSES TO PHQ9 QUESTIONS 1 & 2: 0
1. LITTLE INTEREST OR PLEASURE IN DOING THINGS: 0
2. FEELING DOWN, DEPRESSED OR HOPELESS: 0
SUM OF ALL RESPONSES TO PHQ QUESTIONS 1-9: 0

## 2023-06-20 NOTE — PROGRESS NOTES
g/dL 16.4 (H)    - 420 K/uL 297   AST 10 - 38 U/L 38   ALT 16 - 61 U/L 117 (H)   Alk Phos 45 - 117 U/L 68   Bili, Total 0.2 - 1.0 MG/DL 0.5   Bili, Direct 0.0 - 0.2 MG/DL 0.2   Albumin 3.4 - 5.0 g/dL 4.1   BUN 7.0 - 18 MG/DL 16   Creat 0.6 - 1.3 MG/DL 0.78   Na 136 - 145 mmol/L 141   K 3.5 - 5.5 mmol/L 3.8   Cl 100 - 111 mmol/L 105   CO2 21 - 32 mmol/L 31   Glucose 74 - 99 mg/dL 125 (H)     SEROLOGIES:  2/2017. HBsurface antigen negative, anti-HCV negative, Ferritin 567, AARON negative,     Serologies Latest Ref Rng & Units 8/2/2017   Hep A Ab, Total NEGATIVE   Positive (A)   Hep B Core Ab, Total NEGATIVE   NEGATIVE   Hep B Surface Ab >10.0 mIU/mL <3.10 (L)   Hep B Surface Ab Interp POS   NEGATIVE (A)   Ferritin 8 - 388 NG/ (H)   Iron % Saturation % 37   ASMCA 0 - 19 Units 9   Ceruloplasmin 16.0 - 31.0 mg/dL 28.5   Alpha-1 antitrypsin level 90 - 200 mg/dL 153     LIVER HISTOLOGY:  9/2017. Slides reviewed by MLS. PEREA. 66-75% macro and micovesicular steatosis. Mild ballooning. Mild inflammation. Stage 2 septal fibrosis. AMILCAR (311).    6/2022. [facility]. EkPa was 6.0. IQR/med 28%. . The results suggested a fibrosis level of F1. The CAP score suggests there is hepatic steatosis. 6/2023. Fib[facility]Scan [facility] at [facility]. EkPa was 6.0. IQR/med 9%. . The results suggested a fibrosis level of F1. The CAP score suggests there is hepatic steatosis. ENDOSCOPIC PROCEDURES:  Not available or performed    RADIOLOGY:  2/2021. MRI of the liver. Changes consistent with fatty liver. No liver mass lesions. OTHER TESTING:  Not available or performed      FOLLOW-UP:  All of the issues listed above in the Assessment and Plan were discussed with the patient. All questions were answered. The patient expressed a clear understanding of the above. [address] [address] in 6 months for routine monitoring.

## 2023-09-01 LAB — HBA1C MFR BLD HPLC: 7 %

## 2023-12-14 ENCOUNTER — HOSPITAL ENCOUNTER (OUTPATIENT)
Facility: HOSPITAL | Age: 48
Setting detail: SPECIMEN
Discharge: HOME | End: 2023-12-14
Payer: OTHER GOVERNMENT

## 2023-12-14 ENCOUNTER — OFFICE VISIT (OUTPATIENT)
Age: 48
End: 2023-12-14

## 2023-12-14 VITALS
HEIGHT: 70 IN | HEART RATE: 88 BPM | DIASTOLIC BLOOD PRESSURE: 80 MMHG | WEIGHT: 276 LBS | SYSTOLIC BLOOD PRESSURE: 124 MMHG | TEMPERATURE: 97.6 F | OXYGEN SATURATION: 100 % | BODY MASS INDEX: 39.51 KG/M2

## 2023-12-14 DIAGNOSIS — K75.81 NASH (NONALCOHOLIC STEATOHEPATITIS): Primary | ICD-10-CM

## 2023-12-14 DIAGNOSIS — K75.81 NASH (NONALCOHOLIC STEATOHEPATITIS): ICD-10-CM

## 2023-12-14 LAB
ALBUMIN SERPL-MCNC: 3.8 G/DL (ref 3.4–5)
ALBUMIN/GLOB SERPL: 1.2 (ref 0.8–1.7)
ALP SERPL-CCNC: 55 U/L (ref 45–117)
ALT SERPL-CCNC: 88 U/L (ref 16–61)
ANION GAP SERPL CALC-SCNC: 6 MMOL/L (ref 3–18)
AST SERPL-CCNC: 33 U/L (ref 10–38)
BASOPHILS # BLD: 0.1 K/UL (ref 0–0.1)
BASOPHILS NFR BLD: 1 % (ref 0–2)
BILIRUB DIRECT SERPL-MCNC: 0.2 MG/DL (ref 0–0.2)
BILIRUB SERPL-MCNC: 0.7 MG/DL (ref 0.2–1)
BUN SERPL-MCNC: 17 MG/DL (ref 7–18)
BUN/CREAT SERPL: 21 (ref 12–20)
CALCIUM SERPL-MCNC: 8.9 MG/DL (ref 8.5–10.1)
CHLORIDE SERPL-SCNC: 105 MMOL/L (ref 100–111)
CO2 SERPL-SCNC: 30 MMOL/L (ref 21–32)
CREAT SERPL-MCNC: 0.82 MG/DL (ref 0.6–1.3)
DIFFERENTIAL METHOD BLD: ABNORMAL
EOSINOPHIL # BLD: 0.3 K/UL (ref 0–0.4)
EOSINOPHIL NFR BLD: 3 % (ref 0–5)
ERYTHROCYTE [DISTWIDTH] IN BLOOD BY AUTOMATED COUNT: 14.8 % (ref 11.6–14.5)
GLOBULIN SER CALC-MCNC: 3.2 G/DL (ref 2–4)
GLUCOSE SERPL-MCNC: 115 MG/DL (ref 74–99)
HCT VFR BLD AUTO: 48.3 % (ref 36–48)
HGB BLD-MCNC: 15.4 G/DL (ref 13–16)
IMM GRANULOCYTES # BLD AUTO: 0 K/UL (ref 0–0.04)
IMM GRANULOCYTES NFR BLD AUTO: 0 % (ref 0–0.5)
LYMPHOCYTES # BLD: 1.9 K/UL (ref 0.9–3.6)
LYMPHOCYTES NFR BLD: 21 % (ref 21–52)
MCH RBC QN AUTO: 28.4 PG (ref 24–34)
MCHC RBC AUTO-ENTMCNC: 31.9 G/DL (ref 31–37)
MCV RBC AUTO: 89.1 FL (ref 78–100)
MONOCYTES # BLD: 0.6 K/UL (ref 0.05–1.2)
MONOCYTES NFR BLD: 6 % (ref 3–10)
NEUTS SEG # BLD: 6.5 K/UL (ref 1.8–8)
NEUTS SEG NFR BLD: 69 % (ref 40–73)
NRBC # BLD: 0 K/UL (ref 0–0.01)
NRBC BLD-RTO: 0 PER 100 WBC
PLATELET # BLD AUTO: 322 K/UL (ref 135–420)
PMV BLD AUTO: 10.2 FL (ref 9.2–11.8)
POTASSIUM SERPL-SCNC: 3.7 MMOL/L (ref 3.5–5.5)
PROT SERPL-MCNC: 7 G/DL (ref 6.4–8.2)
RBC # BLD AUTO: 5.42 M/UL (ref 4.35–5.65)
SODIUM SERPL-SCNC: 141 MMOL/L (ref 136–145)
WBC # BLD AUTO: 9.4 K/UL (ref 4.6–13.2)

## 2023-12-14 PROCEDURE — 85025 COMPLETE CBC W/AUTO DIFF WBC: CPT

## 2023-12-14 PROCEDURE — 80076 HEPATIC FUNCTION PANEL: CPT

## 2023-12-14 PROCEDURE — 80048 BASIC METABOLIC PNL TOTAL CA: CPT

## 2023-12-14 PROCEDURE — 36415 COLL VENOUS BLD VENIPUNCTURE: CPT

## 2023-12-14 NOTE — PROGRESS NOTES
routine monitoring and Fibroscan      Alisha Clement, AGPCNP-BC  [address] [place] [address] Liver [facility]  [address], [address] [place] [address], [address] [place] [address]  [phone]

## 2024-07-01 ENCOUNTER — HOSPITAL ENCOUNTER (OUTPATIENT)
Facility: HOSPITAL | Age: 49
Setting detail: SPECIMEN
Discharge: HOME | End: 2024-07-04
Payer: OTHER GOVERNMENT

## 2024-07-01 ENCOUNTER — OFFICE VISIT (OUTPATIENT)
Age: 49
End: 2024-07-01
Payer: OTHER GOVERNMENT

## 2024-07-01 VITALS
TEMPERATURE: 97.4 F | OXYGEN SATURATION: 99 % | HEIGHT: 70 IN | HEART RATE: 82 BPM | SYSTOLIC BLOOD PRESSURE: 102 MMHG | BODY MASS INDEX: 39.37 KG/M2 | WEIGHT: 275 LBS | DIASTOLIC BLOOD PRESSURE: 61 MMHG

## 2024-07-01 DIAGNOSIS — K75.81 NASH (NONALCOHOLIC STEATOHEPATITIS): Primary | ICD-10-CM

## 2024-07-01 DIAGNOSIS — K75.81 NASH (NONALCOHOLIC STEATOHEPATITIS): ICD-10-CM

## 2024-07-01 LAB
ALBUMIN SERPL-MCNC: 4.3 G/DL (ref 3.4–5)
ALBUMIN/GLOB SERPL: 1.5 (ref 0.8–1.7)
ALP SERPL-CCNC: 57 U/L (ref 45–117)
ALT SERPL-CCNC: 75 U/L (ref 16–61)
ANION GAP SERPL CALC-SCNC: 9 MMOL/L (ref 3–18)
AST SERPL-CCNC: 24 U/L (ref 10–38)
BASOPHILS # BLD: 0.1 K/UL (ref 0–0.1)
BASOPHILS NFR BLD: 1 % (ref 0–2)
BILIRUB DIRECT SERPL-MCNC: 0.2 MG/DL (ref 0–0.2)
BILIRUB SERPL-MCNC: 0.8 MG/DL (ref 0.2–1)
BUN SERPL-MCNC: 15 MG/DL (ref 7–18)
BUN/CREAT SERPL: 20 (ref 12–20)
CALCIUM SERPL-MCNC: 9.1 MG/DL (ref 8.5–10.1)
CHLORIDE SERPL-SCNC: 101 MMOL/L (ref 100–111)
CO2 SERPL-SCNC: 29 MMOL/L (ref 21–32)
CREAT SERPL-MCNC: 0.74 MG/DL (ref 0.6–1.3)
DIFFERENTIAL METHOD BLD: ABNORMAL
EOSINOPHIL # BLD: 0.3 K/UL (ref 0–0.4)
EOSINOPHIL NFR BLD: 3 % (ref 0–5)
ERYTHROCYTE [DISTWIDTH] IN BLOOD BY AUTOMATED COUNT: 15.1 % (ref 11.6–14.5)
GLOBULIN SER CALC-MCNC: 2.8 G/DL (ref 2–4)
GLUCOSE SERPL-MCNC: 95 MG/DL (ref 74–99)
HCT VFR BLD AUTO: 46.8 % (ref 36–48)
HGB BLD-MCNC: 14.9 G/DL (ref 13–16)
IMM GRANULOCYTES # BLD AUTO: 0 K/UL (ref 0–0.04)
IMM GRANULOCYTES NFR BLD AUTO: 0 % (ref 0–0.5)
LYMPHOCYTES # BLD: 1.9 K/UL (ref 0.9–3.6)
LYMPHOCYTES NFR BLD: 25 % (ref 21–52)
MCH RBC QN AUTO: 28.1 PG (ref 24–34)
MCHC RBC AUTO-ENTMCNC: 31.8 G/DL (ref 31–37)
MCV RBC AUTO: 88.3 FL (ref 78–100)
MONOCYTES # BLD: 0.4 K/UL (ref 0.05–1.2)
MONOCYTES NFR BLD: 5 % (ref 3–10)
NEUTS SEG # BLD: 5.2 K/UL (ref 1.8–8)
NEUTS SEG NFR BLD: 66 % (ref 40–73)
NRBC # BLD: 0 K/UL (ref 0–0.01)
NRBC BLD-RTO: 0 PER 100 WBC
PLATELET # BLD AUTO: 266 K/UL (ref 135–420)
PMV BLD AUTO: 10.1 FL (ref 9.2–11.8)
POTASSIUM SERPL-SCNC: 3.3 MMOL/L (ref 3.5–5.5)
PROT SERPL-MCNC: 7.1 G/DL (ref 6.4–8.2)
RBC # BLD AUTO: 5.3 M/UL (ref 4.35–5.65)
SODIUM SERPL-SCNC: 139 MMOL/L (ref 136–145)
WBC # BLD AUTO: 7.9 K/UL (ref 4.6–13.2)

## 2024-07-01 PROCEDURE — 36415 COLL VENOUS BLD VENIPUNCTURE: CPT

## 2024-07-01 PROCEDURE — 80048 BASIC METABOLIC PNL TOTAL CA: CPT

## 2024-07-01 PROCEDURE — 85025 COMPLETE CBC W/AUTO DIFF WBC: CPT

## 2024-07-01 PROCEDURE — 3078F DIAST BP <80 MM HG: CPT | Performed by: NURSE PRACTITIONER

## 2024-07-01 PROCEDURE — 3074F SYST BP LT 130 MM HG: CPT | Performed by: NURSE PRACTITIONER

## 2024-07-01 PROCEDURE — 80076 HEPATIC FUNCTION PANEL: CPT

## 2024-07-01 PROCEDURE — 91200 LIVER ELASTOGRAPHY: CPT | Performed by: NURSE PRACTITIONER

## 2024-07-01 PROCEDURE — 99214 OFFICE O/P EST MOD 30 MIN: CPT | Performed by: NURSE PRACTITIONER

## 2024-07-01 RX ORDER — PREDNISONE 10 MG/1
3 TABLET ORAL
COMMUNITY
End: 2024-07-01

## 2024-07-01 RX ORDER — HYDROXYZINE HYDROCHLORIDE 25 MG/1
1 TABLET, FILM COATED ORAL
COMMUNITY

## 2024-07-01 NOTE — PROGRESS NOTES
g/dL 3.8    BUN 7.0 - 18 MG/DL 17    Creat 0.6 - 1.3 MG/DL 0.82    Na 136 - 145 mmol/L 141    K 3.5 - 5.5 mmol/L 3.7    Cl 100 - 111 mmol/L 105    CO2 21 - 32 mmol/L 30    Glucose 74 - 99 mg/dL 115 (H)       SEROLOGIES:  2/2017. HBsurface antigen negative, anti-HCV negative, Ferritin 567, AARON negative,     Serologies Latest Ref Rng & Units 8/2/2017   Hep A Ab, Total NEGATIVE   Positive (A)   Hep B Core Ab, Total NEGATIVE   NEGATIVE   Hep B Surface Ab >10.0 mIU/mL <3.10 (L)   Hep B Surface Ab Interp POS   NEGATIVE (A)   Ferritin 8 - 388 NG/ (H)   Iron % Saturation % 37   ASMCA 0 - 19 Units 9   Ceruloplasmin 16.0 - 31.0 mg/dL 28.5   Alpha-1 antitrypsin level 90 - 200 mg/dL 153     LIVER HISTOLOGY:  9/2017. Slides reviewed by MLS. PEREA.  66-75% macro and micovesicular steatosis. Mild ballooning.  Mild inflammation. Stage 2 septal fibrosis.  AMILCAR (311).    6/2022. FibroScan performed at [facility]. EkPa was 6.0.  IQR/med 28%.  . The results suggested a fibrosis level of F0-1. The CAP score suggests there is hepatic steatosis.      6/2023. FibroScan performed at [facility]. EkPa was 6.0. IQR/med 9%.  . The results suggested a fibrosis level of F0-1. The CAP score suggests there is hepatic steatosis.      7/2024. FibroScan performed at [facility]. EkPa was 6.3. IQR/med 10%. . The results suggested a fibrosis level of F0-1. The CAP score suggests  there is hepatic steatosis.      ENDOSCOPIC PROCEDURES:  Not available or performed    RADIOLOGY:  2/2021. MRI of the liver. Changes consistent with fatty liver. No liver mass lesions.     OTHER TESTING:  Not available or performed    FOLLOW-UP:  All of the issues listed above in the Assessment and Plan were discussed with the patient.  All questions were answered.  The patient expressed a clear understanding of the above.    Follow-up [facility] in 1 year  to assess the effects of diet

## 2025-07-21 ENCOUNTER — OFFICE VISIT (OUTPATIENT)
Age: 50
End: 2025-07-21
Payer: OTHER GOVERNMENT

## 2025-07-21 VITALS
HEIGHT: 70 IN | BODY MASS INDEX: 40.09 KG/M2 | WEIGHT: 280 LBS | HEART RATE: 82 BPM | OXYGEN SATURATION: 96 % | SYSTOLIC BLOOD PRESSURE: 120 MMHG | TEMPERATURE: 97 F | DIASTOLIC BLOOD PRESSURE: 66 MMHG

## 2025-07-21 DIAGNOSIS — K76.0 METABOLIC DYSFUNCTION-ASSOCIATED STEATOTIC LIVER DISEASE (MASLD): Primary | ICD-10-CM

## 2025-07-21 LAB
BASOPHILS # BLD AUTO: 0.1 X10E3/UL (ref 0–0.2)
BASOPHILS NFR BLD AUTO: 1 %
EOSINOPHIL # BLD AUTO: 0.2 X10E3/UL (ref 0–0.4)
EOSINOPHIL NFR BLD AUTO: 3 %
ERYTHROCYTE [DISTWIDTH] IN BLOOD BY AUTOMATED COUNT: 13.8 % (ref 11.6–15.4)
HCT VFR BLD AUTO: 52.7 % (ref 37.5–51)
HGB BLD-MCNC: 16.5 G/DL (ref 13–17.7)
IMM GRANULOCYTES # BLD AUTO: 0 X10E3/UL (ref 0–0.1)
IMM GRANULOCYTES NFR BLD AUTO: 0 %
LYMPHOCYTES # BLD AUTO: 2 X10E3/UL (ref 0.7–3.1)
LYMPHOCYTES NFR BLD AUTO: 24 %
MCH RBC QN AUTO: 27.6 PG (ref 26.6–33)
MCHC RBC AUTO-ENTMCNC: 31.3 G/DL (ref 31.5–35.7)
MCV RBC AUTO: 88 FL (ref 79–97)
MONOCYTES # BLD AUTO: 0.4 X10E3/UL (ref 0.1–0.9)
MONOCYTES NFR BLD AUTO: 5 %
NEUTROPHILS # BLD AUTO: 5.5 X10E3/UL (ref 1.4–7)
NEUTROPHILS NFR BLD AUTO: 67 %
PLATELET # BLD AUTO: 282 X10E3/UL (ref 150–450)
RBC # BLD AUTO: 5.97 X10E6/UL (ref 4.14–5.8)
WBC # BLD AUTO: 8.2 X10E3/UL (ref 3.4–10.8)

## 2025-07-21 PROCEDURE — 3074F SYST BP LT 130 MM HG: CPT | Performed by: INTERNAL MEDICINE

## 2025-07-21 PROCEDURE — 99214 OFFICE O/P EST MOD 30 MIN: CPT | Performed by: INTERNAL MEDICINE

## 2025-07-21 PROCEDURE — 3078F DIAST BP <80 MM HG: CPT | Performed by: INTERNAL MEDICINE

## 2025-07-21 ASSESSMENT — PATIENT HEALTH QUESTIONNAIRE - PHQ9
2. FEELING DOWN, DEPRESSED OR HOPELESS: NOT AT ALL
1. LITTLE INTEREST OR PLEASURE IN DOING THINGS: NOT AT ALL
DEPRESSION UNABLE TO ASSESS: FUNCTIONAL CAPACITY MOTIVATION LIMITS ACCURACY
SUM OF ALL RESPONSES TO PHQ QUESTIONS 1-9: 0

## 2025-07-22 LAB
ALBUMIN SERPL-MCNC: 4.7 G/DL (ref 4.1–5.1)
ALP SERPL-CCNC: 53 IU/L (ref 44–121)
ALT SERPL-CCNC: 87 IU/L (ref 0–44)
AST SERPL-CCNC: 34 IU/L (ref 0–40)
BILIRUB DIRECT SERPL-MCNC: 0.19 MG/DL (ref 0–0.4)
BILIRUB SERPL-MCNC: 0.7 MG/DL (ref 0–1.2)
BUN SERPL-MCNC: 14 MG/DL (ref 6–24)
BUN/CREAT SERPL: 18 (ref 9–20)
CALCIUM SERPL-MCNC: 9.6 MG/DL (ref 8.7–10.2)
CHLORIDE SERPL-SCNC: 97 MMOL/L (ref 96–106)
CO2 SERPL-SCNC: 23 MMOL/L (ref 20–29)
CREAT SERPL-MCNC: 0.76 MG/DL (ref 0.76–1.27)
EGFRCR SERPLBLD CKD-EPI 2021: 111 ML/MIN/1.73
GLUCOSE SERPL-MCNC: 108 MG/DL (ref 70–99)
POTASSIUM SERPL-SCNC: 4.2 MMOL/L (ref 3.5–5.2)
PROT SERPL-MCNC: 7 G/DL (ref 6–8.5)
SODIUM SERPL-SCNC: 138 MMOL/L (ref 134–144)

## 2025-07-29 PROBLEM — K75.81 METABOLIC DYSFUNCTION-ASSOCIATED STEATOHEPATITIS (MASH): Status: ACTIVE | Noted: 2017-08-02

## (undated) DEVICE — KENDALL RADIOLUCENT FOAM MONITORING ELECTRODE RECTANGULAR SHAPE: Brand: KENDALL

## (undated) DEVICE — SPONGE GZ W4XL4IN COT 12 PLY TYP VII WVN C FLD DSGN

## (undated) DEVICE — DRAPE,APERTURE,MINOR PROCEDURE: Brand: MEDLINE

## (undated) DEVICE — SKIN MARKER,REGULAR TIP WITH RULER AND LABELS: Brand: DEVON

## (undated) DEVICE — INTENDED FOR TISSUE SEPARATION, AND OTHER PROCEDURES THAT REQUIRE A SHARP SURGICAL BLADE TO PUNCTURE OR CUT.: Brand: BARD-PARKER ®  SAFETY SCALPED

## (undated) DEVICE — PAD NON-ADHERENT 3X4 STRL LF --

## (undated) DEVICE — MAX-CORE® DISPOSABLE CORE BIOPSY INSTRUMENT, 16G X 16CM: Brand: MAX-CORE

## (undated) DEVICE — SOL IRRIGATION INJ NACL 0.9% 500ML BTL

## (undated) DEVICE — (D)BNDG ADHESIVE FABRIC 3/4X3 -- DISC BY MFR USE ITEM 357960

## (undated) DEVICE — TRAY PREP DRY W/ PREM GLV 2 APPL 6 SPNG 2 UNDPD 1 OVERWRAP

## (undated) DEVICE — MAYO STAND COVER: Brand: CONVERTORS

## (undated) DEVICE — HYPODERMIC SAFETY NEEDLE: Brand: MAGELLAN

## (undated) DEVICE — NEEDLE SPNL 20GA L3.5IN YEL HUB S STL REG WALL FIT STYL W/

## (undated) DEVICE — MAJ-1414 SINGLE USE ADPATER BIOPSY VALV: Brand: SINGLE USE ADAPTOR BIOPSY VALVE

## (undated) DEVICE — (D)SYR 10ML 1/5ML GRAD NSAF -- PKGING CHANGE USE ITEM 338027

## (undated) DEVICE — SYR 10ML LUER LOK 1/5ML GRAD --

## (undated) DEVICE — PREMIUM WET SKIN PREP TRAY: Brand: MEDLINE INDUSTRIES, INC.